# Patient Record
Sex: MALE | Race: WHITE | NOT HISPANIC OR LATINO | Employment: UNEMPLOYED | URBAN - NONMETROPOLITAN AREA
[De-identification: names, ages, dates, MRNs, and addresses within clinical notes are randomized per-mention and may not be internally consistent; named-entity substitution may affect disease eponyms.]

---

## 2018-12-06 ENCOUNTER — APPOINTMENT (OUTPATIENT)
Dept: GENERAL RADIOLOGY | Facility: HOSPITAL | Age: 53
End: 2018-12-06

## 2018-12-06 ENCOUNTER — HOSPITAL ENCOUNTER (EMERGENCY)
Facility: HOSPITAL | Age: 53
Discharge: HOME | End: 2018-12-06
Attending: EMERGENCY MEDICINE | Admitting: EMERGENCY MEDICINE

## 2018-12-06 VITALS
OXYGEN SATURATION: 100 % | TEMPERATURE: 97.6 F | WEIGHT: 175 LBS | SYSTOLIC BLOOD PRESSURE: 137 MMHG | BODY MASS INDEX: 25.92 KG/M2 | DIASTOLIC BLOOD PRESSURE: 92 MMHG | RESPIRATION RATE: 16 BRPM | HEIGHT: 69 IN | HEART RATE: 75 BPM

## 2018-12-06 DIAGNOSIS — S29.9XXA CHEST INJURY, INITIAL ENCOUNTER: Primary | ICD-10-CM

## 2018-12-06 PROCEDURE — 71120 X-RAY EXAM BREASTBONE 2/>VWS: CPT

## 2018-12-06 PROCEDURE — 99284 EMERGENCY DEPT VISIT MOD MDM: CPT

## 2018-12-06 PROCEDURE — 71045 X-RAY EXAM CHEST 1 VIEW: CPT

## 2018-12-06 PROCEDURE — 93005 ELECTROCARDIOGRAM TRACING: CPT | Performed by: EMERGENCY MEDICINE

## 2018-12-06 RX ORDER — IBUPROFEN 800 MG/1
800 TABLET ORAL ONCE
Status: COMPLETED | OUTPATIENT
Start: 2018-12-06 | End: 2018-12-06

## 2018-12-06 RX ORDER — HYDROCODONE BITARTRATE AND ACETAMINOPHEN 5; 325 MG/1; MG/1
1 TABLET ORAL ONCE
Status: COMPLETED | OUTPATIENT
Start: 2018-12-06 | End: 2018-12-06

## 2018-12-06 RX ADMIN — IBUPROFEN 800 MG: 800 TABLET, FILM COATED ORAL at 05:07

## 2018-12-06 RX ADMIN — HYDROCODONE BITARTRATE AND ACETAMINOPHEN 1 TABLET: 5; 325 TABLET ORAL at 05:06

## 2018-12-06 NOTE — ED PROVIDER NOTES
Subjective   53-year-old male presenting with chest injury.  He states that approximately one week ago he slipped and fell while pushing a wheelbarrow, struck the anterior part of his chest.  Since then has had achy type pain across the lower part of his chest, worse with movement and deep breath, has tried nothing to alleviate pain.  He denies any head injury, nausea, vomiting, shortness of breath or other complaints.            Review of Systems   Constitutional: Negative.    HENT: Negative.    Eyes: Negative.    Respiratory: Negative.    Cardiovascular: Positive for chest pain.   Gastrointestinal: Negative.    Genitourinary: Negative.    Musculoskeletal: Negative.    Skin: Negative.    Neurological: Negative.    Psychiatric/Behavioral: Negative.        History reviewed. No pertinent past medical history.    No Known Allergies    History reviewed. No pertinent surgical history.    History reviewed. No pertinent family history.    Social History     Socioeconomic History   • Marital status: Single     Spouse name: Not on file   • Number of children: Not on file   • Years of education: Not on file   • Highest education level: Not on file   Tobacco Use   • Smoking status: Never Smoker   Substance and Sexual Activity   • Alcohol use: No     Frequency: Never   • Drug use: No           Objective   Physical Exam   Constitutional: He is oriented to person, place, and time. He appears well-developed and well-nourished. No distress.   HENT:   Head: Normocephalic and atraumatic.   Right Ear: External ear normal.   Left Ear: External ear normal.   Nose: Nose normal.   Mouth/Throat: Oropharynx is clear and moist.   Eyes: Conjunctivae and EOM are normal. Pupils are equal, round, and reactive to light.   Neck: Normal range of motion. Neck supple.   Cardiovascular: Normal rate, regular rhythm, normal heart sounds and intact distal pulses.   Pulmonary/Chest: Effort normal and breath sounds normal. No respiratory distress.    Diffuse tenderness across the mid to lower chest, no crepitus or palpable deformities   Abdominal: Soft. Bowel sounds are normal. He exhibits no distension. There is no tenderness. There is no rebound and no guarding.   Musculoskeletal: Normal range of motion. He exhibits no edema, tenderness or deformity.   Neurological: He is alert and oriented to person, place, and time.   Skin: Skin is warm and dry. No rash noted.   Psychiatric: He has a normal mood and affect. His behavior is normal.   Nursing note and vitals reviewed.      Procedures           ED Course                  MDM  Number of Diagnoses or Management Options  Chest injury, initial encounter:   Diagnosis management comments: 53-year-old male with chest injury.  Well-developed, well-nourished man in no distress with normal vital signs and exam as above.  We'll check x-ray and EKG.  We'll treat him symptomatically.  Disposition pending workup though anticipate discharge home.    DDX: Contusion, fracture    EKG interpreted by me: sinus rhythm, normal rate, left axis, no acute ST/T changes, this is an abnormal EKG, this appears similar to previous    Xrays per my interpretation reveal no acute abnormalities. Will discharge home.         Amount and/or Complexity of Data Reviewed  Tests in the radiology section of CPT®: reviewed          Final diagnoses:   Chest injury, initial encounter          Carter Bah MD  12/06/18 0556

## 2018-12-31 ENCOUNTER — HOSPITAL ENCOUNTER (OUTPATIENT)
Dept: GENERAL RADIOLOGY | Facility: HOSPITAL | Age: 53
Discharge: HOME | End: 2018-12-31
Admitting: INTERNAL MEDICINE

## 2018-12-31 ENCOUNTER — TRANSCRIBE ORDERS (OUTPATIENT)
Dept: ADMINISTRATIVE | Facility: HOSPITAL | Age: 53
End: 2018-12-31

## 2018-12-31 DIAGNOSIS — R07.89 CHEST WALL PAIN: ICD-10-CM

## 2018-12-31 DIAGNOSIS — R07.89 CHEST WALL PAIN: Primary | ICD-10-CM

## 2018-12-31 PROCEDURE — 71101 X-RAY EXAM UNILAT RIBS/CHEST: CPT

## 2019-03-08 ENCOUNTER — HOSPITAL ENCOUNTER (EMERGENCY)
Facility: HOSPITAL | Age: 54
Discharge: HOME | End: 2019-03-08
Attending: EMERGENCY MEDICINE | Admitting: EMERGENCY MEDICINE

## 2019-03-08 VITALS
RESPIRATION RATE: 16 BRPM | HEART RATE: 111 BPM | OXYGEN SATURATION: 96 % | SYSTOLIC BLOOD PRESSURE: 147 MMHG | TEMPERATURE: 97.6 F | DIASTOLIC BLOOD PRESSURE: 85 MMHG

## 2019-03-08 DIAGNOSIS — T50.901A ACCIDENTAL DRUG OVERDOSE, INITIAL ENCOUNTER: ICD-10-CM

## 2019-03-08 DIAGNOSIS — F19.10 DRUG ABUSE (HCC): Primary | ICD-10-CM

## 2019-03-08 DIAGNOSIS — E87.6 HYPOKALEMIA: ICD-10-CM

## 2019-03-08 LAB
AMPHET+METHAMPHET UR QL: POSITIVE
AMPHETAMINES UR QL: POSITIVE
ANION GAP SERPL CALCULATED.3IONS-SCNC: 13.7 MMOL/L (ref 10–20)
BARBITURATES UR QL SCN: NEGATIVE
BASOPHILS # BLD AUTO: 0.08 10*3/MM3 (ref 0–0.2)
BASOPHILS NFR BLD AUTO: 0.8 % (ref 0–2.5)
BENZODIAZ UR QL SCN: NEGATIVE
BUN BLD-MCNC: 12 MG/DL (ref 7–20)
BUN/CREAT SERPL: 13.3 (ref 6.3–21.9)
BUPRENORPHINE SERPL-MCNC: NEGATIVE NG/ML
CALCIUM SPEC-SCNC: 9.2 MG/DL (ref 8.4–10.2)
CANNABINOIDS SERPL QL: POSITIVE
CHLORIDE SERPL-SCNC: 101 MMOL/L (ref 98–107)
CO2 SERPL-SCNC: 26 MMOL/L (ref 26–30)
COCAINE UR QL: NEGATIVE
CREAT BLD-MCNC: 0.9 MG/DL (ref 0.6–1.3)
DEPRECATED RDW RBC AUTO: 40.5 FL (ref 37–54)
EOSINOPHIL # BLD AUTO: 0.08 10*3/MM3 (ref 0–0.7)
EOSINOPHIL NFR BLD AUTO: 0.8 % (ref 0–7)
ERYTHROCYTE [DISTWIDTH] IN BLOOD BY AUTOMATED COUNT: 13.6 % (ref 11.5–14.5)
GFR SERPL CREATININE-BSD FRML MDRD: 89 ML/MIN/1.73
GLUCOSE BLD-MCNC: 215 MG/DL (ref 74–98)
HCT VFR BLD AUTO: 39.9 % (ref 42–52)
HGB BLD-MCNC: 12.9 G/DL (ref 14–18)
IMM GRANULOCYTES # BLD AUTO: 0.06 10*3/MM3 (ref 0–0.06)
IMM GRANULOCYTES NFR BLD AUTO: 0.6 % (ref 0–0.6)
LYMPHOCYTES # BLD AUTO: 3.64 10*3/MM3 (ref 0.6–3.4)
LYMPHOCYTES NFR BLD AUTO: 35 % (ref 10–50)
MCH RBC QN AUTO: 26.7 PG (ref 27–31)
MCHC RBC AUTO-ENTMCNC: 32.3 G/DL (ref 30–37)
MCV RBC AUTO: 82.4 FL (ref 80–94)
METHADONE UR QL SCN: NEGATIVE
MONOCYTES # BLD AUTO: 0.98 10*3/MM3 (ref 0–0.9)
MONOCYTES NFR BLD AUTO: 9.4 % (ref 0–12)
NEUTROPHILS # BLD AUTO: 5.56 10*3/MM3 (ref 2–6.9)
NEUTROPHILS NFR BLD AUTO: 53.4 % (ref 37–80)
NRBC BLD AUTO-RTO: 0 /100 WBC (ref 0–0)
OPIATES UR QL: POSITIVE
OXYCODONE UR QL SCN: NEGATIVE
PCP UR QL SCN: NEGATIVE
PLATELET # BLD AUTO: 335 10*3/MM3 (ref 130–400)
PMV BLD AUTO: 9.4 FL (ref 6–12)
POTASSIUM BLD-SCNC: 2.7 MMOL/L (ref 3.5–5.1)
PROPOXYPH UR QL: NEGATIVE
RBC # BLD AUTO: 4.84 10*6/MM3 (ref 4.7–6.1)
SODIUM BLD-SCNC: 138 MMOL/L (ref 137–145)
TRICYCLICS UR QL SCN: NEGATIVE
WBC NRBC COR # BLD: 10.4 10*3/MM3 (ref 4.8–10.8)

## 2019-03-08 PROCEDURE — 99284 EMERGENCY DEPT VISIT MOD MDM: CPT

## 2019-03-08 PROCEDURE — 85025 COMPLETE CBC W/AUTO DIFF WBC: CPT | Performed by: EMERGENCY MEDICINE

## 2019-03-08 PROCEDURE — 93005 ELECTROCARDIOGRAM TRACING: CPT | Performed by: EMERGENCY MEDICINE

## 2019-03-08 PROCEDURE — 80306 DRUG TEST PRSMV INSTRMNT: CPT | Performed by: EMERGENCY MEDICINE

## 2019-03-08 PROCEDURE — 80048 BASIC METABOLIC PNL TOTAL CA: CPT | Performed by: EMERGENCY MEDICINE

## 2019-03-08 RX ORDER — POTASSIUM CHLORIDE 20 MEQ/1
20 TABLET, EXTENDED RELEASE ORAL DAILY
Qty: 4 TABLET | Refills: 0 | Status: SHIPPED | OUTPATIENT
Start: 2019-03-08 | End: 2019-03-12

## 2019-03-08 RX ORDER — ONDANSETRON 4 MG/1
4 TABLET, ORALLY DISINTEGRATING ORAL ONCE
Status: COMPLETED | OUTPATIENT
Start: 2019-03-08 | End: 2019-03-08

## 2019-03-08 RX ORDER — POTASSIUM CHLORIDE 750 MG/1
40 CAPSULE, EXTENDED RELEASE ORAL ONCE
Status: COMPLETED | OUTPATIENT
Start: 2019-03-08 | End: 2019-03-08

## 2019-03-08 RX ADMIN — ONDANSETRON 4 MG: 4 TABLET, ORALLY DISINTEGRATING ORAL at 10:51

## 2019-03-08 RX ADMIN — POTASSIUM CHLORIDE 40 MEQ: 750 CAPSULE, EXTENDED RELEASE ORAL at 10:11

## 2019-03-08 NOTE — ED NOTES
Patient unable to provide a urine sample.  In & out cath performed per sterile technique with Homer Lopez RN.  Unable to use straight cath due to prostate.  Coude 18 Amharic used.  UA collected.       Angella Roy, RN  03/08/19 7948

## 2019-03-08 NOTE — ED NOTES
Patient given Zofran 4 mg ODT and discharge instructions.  Officer Milly given discharge instructions with new rx.      Angella Roy RN  03/08/19 9070

## 2019-03-08 NOTE — ED PROVIDER NOTES
Subjective   History of Present Illness   53-year-old male brought in by EMS after found unresponsive, given 2 mg of intranasal Narcan, with return to baseline mental status.  Patient denies any using any illicit drugs.  States that he use some tramadol, steroids, Flexeril and went to work.  Refuses to answer any other specific questions and denies multiple times using any illicit drugs.    Review of Systems   All other systems reviewed and are negative.      History reviewed. No pertinent past medical history.    No Known Allergies    History reviewed. No pertinent surgical history.    History reviewed. No pertinent family history.    Social History     Socioeconomic History   • Marital status: Single     Spouse name: Not on file   • Number of children: Not on file   • Years of education: Not on file   • Highest education level: Not on file   Tobacco Use   • Smoking status: Never Smoker   Substance and Sexual Activity   • Alcohol use: No     Frequency: Never   • Drug use: No           Objective   Physical Exam   Constitutional: He is oriented to person, place, and time. He appears well-developed and well-nourished. No distress.   HENT:   Head: Normocephalic.   Mouth/Throat: Oropharynx is clear and moist.   Eyes: EOM are normal. No scleral icterus.   Pupils are pinpoint bilaterally.   Neck: Normal range of motion. Neck supple. No tracheal deviation present.   Cardiovascular: Normal rate, regular rhythm, normal heart sounds and intact distal pulses. Exam reveals no gallop and no friction rub.   No murmur heard.  Pulmonary/Chest: Effort normal and breath sounds normal. No stridor. No respiratory distress. He has no wheezes. He has no rales. He exhibits no tenderness.   Abdominal: Soft. He exhibits no distension and no mass. There is no tenderness. There is no rebound and no guarding.   Musculoskeletal: Normal range of motion. He exhibits no deformity.   Neurological: He is alert and oriented to person, place, and  time.   Skin: Skin is warm and dry. No rash noted. He is not diaphoretic. No erythema. No pallor.   Psychiatric: He has a normal mood and affect. His behavior is normal.   Nursing note and vitals reviewed.      Procedures           ED Course  ED Course as of Mar 08 1015   Fri Mar 08, 2019   0940 EKG: Interpreted by me.  Sinus tachycardia with a rate of 115, narrow complex, normal intervals, no ST elevation or depression.  Abnormal EKG.  [AI]      ED Course User Index  [AI] Carlos Freitas MD                  [facility]  53-year-old male here with EMS after found unresponsive with return after Narcan.  Afebrile, vital signs are normal for mild tachycardia.  High suspicion that he overdosed on opiate of some sort.  Low suspicion that he is being truthful about his drug use.  Given that he will not admit to drug use and I cannot otherwise explain his unresponsiveness, we will send lab work including a drug screen, EKG, and monitor further.    10:15 AM patient with low potassium that we are repleting and will send home with more.  Labs otherwise reassuring other than opiates, amphetamines, and methamphetamines in his urine.  Patient is remained stable throughout his stay as will discharge at this time.    Final diagnoses:   Drug abuse (CMS/[facility])   Accidental drug overdose, initial encounter   Hypokalemia            Carlos Freitas MD  03/08/19 1016

## 2019-03-08 NOTE — ED NOTES
Patient was discharged and in custody of [facility] Officer Olrando Atkins.  He stated that he needed to go the bathroom to urinate.  Patient began vomiting.  MD notified.      Angella Roy, RN  03/08/19 1282

## 2020-11-11 ENCOUNTER — HOSPITAL ENCOUNTER (EMERGENCY)
Facility: HOSPITAL | Age: 55
Discharge: HOME | End: 2020-11-11
Attending: EMERGENCY MEDICINE | Admitting: EMERGENCY MEDICINE

## 2020-11-11 ENCOUNTER — APPOINTMENT (OUTPATIENT)
Dept: ULTRASOUND IMAGING | Facility: HOSPITAL | Age: 55
End: 2020-11-11

## 2020-11-11 VITALS
WEIGHT: 180 LBS | DIASTOLIC BLOOD PRESSURE: 74 MMHG | HEIGHT: 69 IN | OXYGEN SATURATION: 100 % | TEMPERATURE: 99.1 F | HEART RATE: 80 BPM | BODY MASS INDEX: 26.66 KG/M2 | SYSTOLIC BLOOD PRESSURE: 129 MMHG | RESPIRATION RATE: 18 BRPM

## 2020-11-11 DIAGNOSIS — I77.6 VASCULITIS (HCC): Primary | ICD-10-CM

## 2020-11-11 LAB
ALBUMIN SERPL-MCNC: 3 G/DL (ref 3.5–5.2)
ALBUMIN/GLOB SERPL: 0.8 G/DL
ALP SERPL-CCNC: 124 U/L (ref 39–117)
ALT SERPL W P-5'-P-CCNC: 165 U/L (ref 1–41)
ANION GAP SERPL CALCULATED.3IONS-SCNC: 8.9 MMOL/L (ref 5–15)
AST SERPL-CCNC: 131 U/L (ref 1–40)
BILIRUB SERPL-MCNC: 0.2 MG/DL (ref 0–1.2)
BUN SERPL-MCNC: 13 MG/DL (ref 6–20)
BUN/CREAT SERPL: 20.6 (ref 7–25)
CALCIUM SPEC-SCNC: 8.6 MG/DL (ref 8.6–10.5)
CHLORIDE SERPL-SCNC: 99 MMOL/L (ref 98–107)
CO2 SERPL-SCNC: 27.1 MMOL/L (ref 22–29)
CREAT SERPL-MCNC: 0.63 MG/DL (ref 0.76–1.27)
CRP SERPL-MCNC: 7.87 MG/DL (ref 0–0.5)
DEPRECATED RDW RBC AUTO: 39.8 FL (ref 37–54)
EOSINOPHIL # BLD MANUAL: 0.25 10*3/MM3 (ref 0–0.4)
EOSINOPHIL NFR BLD MANUAL: 4 % (ref 0.3–6.2)
ERYTHROCYTE [DISTWIDTH] IN BLOOD BY AUTOMATED COUNT: 14 % (ref 12.3–15.4)
ERYTHROCYTE [SEDIMENTATION RATE] IN BLOOD: 38 MM/HR (ref 0–15)
GFR SERPL CREATININE-BSD FRML MDRD: 133 ML/MIN/1.73
GLOBULIN UR ELPH-MCNC: 3.7 GM/DL
GLUCOSE SERPL-MCNC: 123 MG/DL (ref 65–99)
HCT VFR BLD AUTO: 33.4 % (ref 37.5–51)
HGB BLD-MCNC: 10.5 G/DL (ref 13–17.7)
LYMPHOCYTES # BLD MANUAL: 1.75 10*3/MM3 (ref 0.7–3.1)
LYMPHOCYTES NFR BLD MANUAL: 28 % (ref 19.6–45.3)
LYMPHOCYTES NFR BLD MANUAL: 8 % (ref 5–12)
MCH RBC QN AUTO: 24.7 PG (ref 26.6–33)
MCHC RBC AUTO-ENTMCNC: 31.4 G/DL (ref 31.5–35.7)
MCV RBC AUTO: 78.6 FL (ref 79–97)
MONOCYTES # BLD AUTO: 0.5 10*3/MM3 (ref 0.1–0.9)
NEUTROPHILS # BLD AUTO: 3.44 10*3/MM3 (ref 1.7–7)
NEUTROPHILS NFR BLD MANUAL: 53 % (ref 42.7–76)
NEUTS BAND NFR BLD MANUAL: 2 % (ref 0–5)
PLAT MORPH BLD: NORMAL
PLATELET # BLD AUTO: 246 10*3/MM3 (ref 140–450)
PMV BLD AUTO: 8.6 FL (ref 6–12)
POTASSIUM SERPL-SCNC: 3.9 MMOL/L (ref 3.5–5.2)
PROT SERPL-MCNC: 6.7 G/DL (ref 6–8.5)
RBC # BLD AUTO: 4.25 10*6/MM3 (ref 4.14–5.8)
RBC MORPH BLD: NORMAL
SCAN SLIDE: NORMAL
SMALL PLATELETS BLD QL SMEAR: ADEQUATE
SODIUM SERPL-SCNC: 135 MMOL/L (ref 136–145)
URATE SERPL-MCNC: 4.7 MG/DL (ref 3.4–7)
VARIANT LYMPHS NFR BLD MANUAL: 5 % (ref 0–5)
WBC # BLD AUTO: 6.25 10*3/MM3 (ref 3.4–10.8)
WBC MORPH BLD: NORMAL

## 2020-11-11 PROCEDURE — 85007 BL SMEAR W/DIFF WBC COUNT: CPT | Performed by: PHYSICIAN ASSISTANT

## 2020-11-11 PROCEDURE — 99283 EMERGENCY DEPT VISIT LOW MDM: CPT

## 2020-11-11 PROCEDURE — 93970 EXTREMITY STUDY: CPT

## 2020-11-11 PROCEDURE — 84550 ASSAY OF BLOOD/URIC ACID: CPT | Performed by: PHYSICIAN ASSISTANT

## 2020-11-11 PROCEDURE — 85651 RBC SED RATE NONAUTOMATED: CPT | Performed by: PHYSICIAN ASSISTANT

## 2020-11-11 PROCEDURE — 80053 COMPREHEN METABOLIC PANEL: CPT | Performed by: PHYSICIAN ASSISTANT

## 2020-11-11 PROCEDURE — 85025 COMPLETE CBC W/AUTO DIFF WBC: CPT | Performed by: PHYSICIAN ASSISTANT

## 2020-11-11 PROCEDURE — 86140 C-REACTIVE PROTEIN: CPT | Performed by: PHYSICIAN ASSISTANT

## 2020-11-11 RX ORDER — CEPHALEXIN 500 MG/1
500 CAPSULE ORAL 2 TIMES DAILY
Qty: 20 CAPSULE | Refills: 0 | Status: SHIPPED | OUTPATIENT
Start: 2020-11-11

## 2020-11-11 RX ORDER — PREDNISONE 20 MG/1
20 TABLET ORAL DAILY
Qty: 2 TABLET | Refills: 0 | Status: SHIPPED | OUTPATIENT
Start: 2020-11-11

## 2020-11-11 RX ORDER — SODIUM CHLORIDE 0.9 % (FLUSH) 0.9 %
10 SYRINGE (ML) INJECTION AS NEEDED
Status: DISCONTINUED | OUTPATIENT
Start: 2020-11-11 | End: 2020-11-11 | Stop reason: HOSPADM

## 2020-11-11 RX ORDER — INDOMETHACIN 50 MG/1
50 CAPSULE ORAL
Qty: 15 CAPSULE | Refills: 0 | Status: SHIPPED | OUTPATIENT
Start: 2020-11-11

## 2020-11-11 RX ADMIN — SODIUM CHLORIDE 500 ML: 9 INJECTION, SOLUTION INTRAVENOUS at 14:47

## 2020-11-11 NOTE — ED PROVIDER NOTES
Subjective   This is a 55-year-old male who presents to the emergency department chief complaint bilateral lower extremity edema and swelling over the past day.  Patient denies any recent injury or trauma.  Patient denies any associated chest pain, shortness of breath, weakness.  Patient states that he is on his feet for long hours during the day.  Patient states that he started developing lower extremity edema as well as rash.      History provided by:  Patient   used: No    Leg Swelling  Location:  Bilateral leg swelling  Quality:  Aching, throbbing pain  Severity:  Moderate  Onset quality:  Gradual  Duration:  1 day  Timing:  Intermittent  Progression:  Worsening  Chronicity:  New  Associated symptoms: no chest pain, no congestion, no cough, no diarrhea, no fever, no headaches, no myalgias, no rash, no rhinorrhea, no shortness of breath, no sore throat and no vomiting        Review of Systems   Constitutional: Negative.  Negative for fever.   HENT: Negative for congestion, rhinorrhea and sore throat.    Eyes: Negative.  Negative for pain.   Respiratory: Negative for apnea, cough, choking, chest tightness and shortness of breath.    Cardiovascular: Negative.  Negative for chest pain.   Gastrointestinal: Negative.  Negative for diarrhea and vomiting.   Genitourinary: Negative for discharge, dysuria, enuresis, flank pain, frequency, genital sores and hematuria.   Musculoskeletal: Positive for joint swelling. Negative for gait problem and myalgias.   Skin: Negative for rash.   Neurological: Negative for headaches.   All other systems reviewed and are negative.      Past Medical History:   Diagnosis Date   • Pancreatitis        No Known Allergies    Past Surgical History:   Procedure Laterality Date   • APPENDECTOMY         History reviewed. No pertinent family history.    Social History     Socioeconomic History   • Marital status: Single     Spouse name: Not on file   • Number of children: Not on  file   • Years of education: Not on file   • Highest education level: Not on file   Tobacco Use   • Smoking status: Never Smoker   Substance and Sexual Activity   • Alcohol use: No     Frequency: Never   • Drug use: No           Objective   Physical Exam  Vitals signs and nursing note reviewed.   Constitutional:       General: He is not in acute distress.     Appearance: Normal appearance. He is normal weight. He is not ill-appearing, toxic-appearing or diaphoretic.   HENT:      Head: Normocephalic and atraumatic.      Right Ear: Tympanic membrane, ear canal and external ear normal.      Left Ear: Tympanic membrane, ear canal and external ear normal.      Nose: Nose normal. No congestion or rhinorrhea.      Mouth/Throat:      Mouth: Mucous membranes are dry.      Pharynx: Oropharynx is clear. No oropharyngeal exudate or posterior oropharyngeal erythema.   Eyes:      General: No scleral icterus.        Right eye: No discharge.         Left eye: No discharge.      Extraocular Movements: Extraocular movements intact.      Conjunctiva/sclera: Conjunctivae normal.      Pupils: Pupils are equal, round, and reactive to light.   Neck:      Musculoskeletal: Normal range of motion and neck supple. No neck rigidity or muscular tenderness.      Vascular: No carotid bruit.   Cardiovascular:      Rate and Rhythm: Normal rate and regular rhythm.      Pulses: Normal pulses.      Heart sounds: Normal heart sounds. No murmur. No friction rub. No gallop.    Pulmonary:      Effort: Pulmonary effort is normal. No respiratory distress.      Breath sounds: Normal breath sounds. No stridor. No wheezing or rhonchi.   Chest:      Chest wall: No tenderness.   Abdominal:      General: Abdomen is flat. Bowel sounds are normal. There is no distension.      Palpations: Abdomen is soft. There is no mass.      Tenderness: There is no abdominal tenderness. There is no right CVA tenderness.      Hernia: No hernia is present.   Musculoskeletal:          General: Swelling and tenderness present.      Right ankle: He exhibits decreased range of motion and swelling. Tenderness.      Left ankle: He exhibits decreased range of motion and swelling. Tenderness.   Lymphadenopathy:      Cervical: No cervical adenopathy.   Skin:     General: Skin is warm.      Capillary Refill: Capillary refill takes less than 2 seconds.      Coloration: Skin is not jaundiced or pale.      Findings: No bruising, erythema or lesion.   Neurological:      General: No focal deficit present.      Mental Status: He is alert and oriented to person, place, and time.      Cranial Nerves: No cranial nerve deficit.      Sensory: No sensory deficit.      Motor: No weakness.      Coordination: Coordination normal.      Gait: Gait normal.   Psychiatric:         Mood and Affect: Mood normal.         Behavior: Behavior normal.         Thought Content: Thought content normal.         Judgment: Judgment normal.         Procedures           ED Course  ED Course as of Nov 11 1618   Wed Nov 11, 2020   1605 IMPRESSION:  No sonographic evidence of deep venous thrombosis of the  lower extremities.       [BH]   1616 This is a 55-year-old male who presents with chief complaint of lower extremity swelling and edema.  Patient did have negative work-up for any acute DVT or blood clot.  Patient's uric acid level is within normal limits.  Patient does have some findings consistent with vasculitis.  Patient will be started on oral antibiotics as well as anti-inflammatories.  Patient advised to return to the emergency department if condition worsens.    []      ED Course User Index  [] Raffy Arreaga PA-C                                           [facility]    Final diagnoses:   Vasculitis (CMS/[facility])            Raffy Arreaga PA-C  11/11/20 1614

## 2023-06-06 ENCOUNTER — OFFICE VISIT (OUTPATIENT)
Dept: NEUROSURGERY | Facility: CLINIC | Age: 58
End: 2023-06-06
Payer: MEDICAID

## 2023-06-06 VITALS
SYSTOLIC BLOOD PRESSURE: 140 MMHG | HEIGHT: 69 IN | TEMPERATURE: 97.8 F | DIASTOLIC BLOOD PRESSURE: 82 MMHG | BODY MASS INDEX: 26.66 KG/M2 | WEIGHT: 180 LBS

## 2023-06-06 DIAGNOSIS — M47.817 LUMBOSACRAL SPONDYLOSIS WITHOUT MYELOPATHY: Primary | ICD-10-CM

## 2023-06-06 PROCEDURE — 99204 OFFICE O/P NEW MOD 45 MIN: CPT | Performed by: STUDENT IN AN ORGANIZED HEALTH CARE EDUCATION/TRAINING PROGRAM

## 2023-06-06 PROCEDURE — 1160F RVW MEDS BY RX/DR IN RCRD: CPT | Performed by: STUDENT IN AN ORGANIZED HEALTH CARE EDUCATION/TRAINING PROGRAM

## 2023-06-06 PROCEDURE — 1159F MED LIST DOCD IN RCRD: CPT | Performed by: STUDENT IN AN ORGANIZED HEALTH CARE EDUCATION/TRAINING PROGRAM

## 2023-06-06 RX ORDER — FAMOTIDINE 20 MG/1
TABLET, FILM COATED ORAL
COMMUNITY
Start: 2023-04-26

## 2023-06-06 RX ORDER — OMEPRAZOLE 40 MG/1
CAPSULE, DELAYED RELEASE ORAL
COMMUNITY
Start: 2023-04-26

## 2023-06-06 RX ORDER — AMLODIPINE BESYLATE 10 MG/1
1 TABLET ORAL DAILY
COMMUNITY
Start: 2023-04-26
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
## 2024-09-10 DIAGNOSIS — M54.16 LUMBAR RADICULOPATHY: Primary | ICD-10-CM

## 2024-09-12 ENCOUNTER — OFFICE VISIT (OUTPATIENT)
Dept: NEUROSURGERY | Facility: CLINIC | Age: 59
End: 2024-09-12
Payer: MEDICAID

## 2024-09-12 VITALS — WEIGHT: 183 LBS | HEIGHT: 69 IN | TEMPERATURE: 97.8 F | BODY MASS INDEX: 27.11 KG/M2

## 2024-09-12 DIAGNOSIS — M51.36 DDD (DEGENERATIVE DISC DISEASE), LUMBAR: ICD-10-CM

## 2024-09-12 DIAGNOSIS — M54.16 LUMBAR RADICULOPATHY: Primary | ICD-10-CM

## 2024-09-12 RX ORDER — BACLOFEN 10 MG/1
TABLET ORAL
COMMUNITY

## 2024-09-12 RX ORDER — HYDROCHLOROTHIAZIDE 12.5 MG/1
12.5 CAPSULE ORAL DAILY
COMMUNITY

## 2024-09-12 RX ORDER — METOPROLOL SUCCINATE 25 MG/1
25 TABLET, EXTENDED RELEASE ORAL DAILY
COMMUNITY
Start: 2024-06-14

## 2024-09-12 RX ORDER — KETOROLAC TROMETHAMINE 10 MG/1
10 TABLET, FILM COATED ORAL EVERY 6 HOURS PRN
COMMUNITY
Start: 2024-07-23

## 2024-09-12 RX ORDER — ONDANSETRON 4 MG/1
TABLET, ORALLY DISINTEGRATING ORAL
COMMUNITY
Start: 2024-09-05

## 2024-09-12 RX ORDER — CYCLOBENZAPRINE HCL 10 MG
10 TABLET ORAL 3 TIMES DAILY PRN
COMMUNITY
Start: 2024-07-23

## 2024-09-12 RX ORDER — GABAPENTIN 600 MG/1
TABLET ORAL
COMMUNITY
Start: 2024-09-10

## 2024-09-12 RX ORDER — METRONIDAZOLE 500 MG/1
1 TABLET ORAL 3 TIMES DAILY
COMMUNITY
Start: 2024-09-05

## 2024-09-12 RX ORDER — LISINOPRIL 5 MG/1
5 TABLET ORAL DAILY
COMMUNITY

## 2024-09-12 RX ORDER — PREDNISONE 20 MG/1
2 TABLET ORAL DAILY
COMMUNITY
Start: 2024-07-23

## 2024-09-12 RX ORDER — ATORVASTATIN CALCIUM 40 MG/1
40 TABLET, FILM COATED ORAL DAILY
COMMUNITY

## 2024-09-12 NOTE — PROGRESS NOTES
"NEUROSURGERY PROGRESS NOTE    Patient: Aldo Humphrey  : 1965    Primary Care Provider: Andie Murray APRN    Chief Complaint: Right leg pain    Subjective: This is a 59-year-old male who is previous evaluated for lower back pain.  He comes in with progressively worsening right leg pain.  He states has been dealing with this for quite some time.  He has pain starts in his right buttock and radiates down the lateral aspect of his leg to the foot.  He has previously done physical therapy and injections, but this has not given him any significant relief.  He denies any significant left leg symptoms or bowel or bladder incontinence.    Objective    Vital Signs: Temperature 97.8 °F (36.6 °C), temperature source Infrared, height 175.3 cm (69\"), weight 83 kg (183 lb).    Physical Exam  Awake, alert and oriented x 3  Opens eyes spont  Pupils 3 mm rx bilat  Extraocular muscles intact bilaterally  Face symmetric bilaterally  Tongue midline  5/5 in the lower extremities bilaterally with exception of right dorsiflexion which is 3 out of 5    Current Medications:   Current Outpatient Medications:     amLODIPine (NORVASC) 10 MG tablet, Take 1 tablet by mouth Daily., Disp: , Rfl:     atorvastatin (LIPITOR) 40 MG tablet, Take 1 tablet by mouth Daily., Disp: , Rfl:     baclofen (LIORESAL) 10 MG tablet, TAKE 1 TO 2 TABLETS BY MOUTH TWICE DAILY AS NEEDED FOR PAIN, Disp: , Rfl:     cyclobenzaprine (FLEXERIL) 10 MG tablet, Take 1 tablet by mouth 3 (Three) Times a Day As Needed. for muscle spams, Disp: , Rfl:     famotidine (PEPCID) 20 MG tablet, TAKE 1 TABLET BY MOUTH EVERY DAY AT BEDTIME - MAY TAKE UP TO TWICE DAILY IF NEEDED/AS DIRECTED, Disp: , Rfl:     gabapentin (NEURONTIN) 600 MG tablet, TAKE 1 TABLET BY MOUTH THREE TIMES DAILY AS NEEDED FOR BACK/ LEG PAIN, Disp: , Rfl:     hydroCHLOROthiazide (MICROZIDE) 12.5 MG capsule, Take 1 capsule by mouth Daily., Disp: , Rfl:     ketorolac (TORADOL) 10 MG tablet, Take 1 " tablet by mouth Every 6 (Six) Hours As Needed. for pain, Disp: , Rfl:     lisinopril (PRINIVIL,ZESTRIL) 5 MG tablet, Take 1 tablet by mouth Daily., Disp: , Rfl:     metoprolol succinate XL (TOPROL-XL) 25 MG 24 hr tablet, Take 1 tablet by mouth Daily., Disp: , Rfl:     metroNIDAZOLE (FLAGYL) 500 MG tablet, Take 1 tablet by mouth 3 times a day., Disp: , Rfl:     omeprazole (priLOSEC) 40 MG capsule, TAKE 1 CAPSULE BY MOUTH ONCE DAILY 30 MINUTES BEFORE FIRST MEAL, Disp: , Rfl:     ondansetron ODT (ZOFRAN-ODT) 4 MG disintegrating tablet, dissolve 1 tablet in mouth every 8 hours as needed for nausea, Disp: , Rfl:     predniSONE (DELTASONE) 20 MG tablet, Take 2 tablets by mouth Daily., Disp: , Rfl:      Laboratory Results:                              Brief Urine Lab Results       None          Microbiology Results (last 10 days)       ** No results found for the last 240 hours. **            Diagnostic Imaging: I reviewed and independently interpreted the new imaging.     Assessment/Plan:  This is a 59-year-old male who presents with worsening back and right leg pain.  In reviewing the patient's lumbar MRI, unfortunately it is of very poor quality.  It is quite blurry at the L4-5 level and it is difficult to discern how much neuroforaminal stenosis is present.  I think it is possible the patient is having symptoms of an L5 radiculopathy given his distribution of pain and dorsiflexion weakness, but I would like to have a better visualization with a new MRI scan.  Additionally, we are going to obtain lumbar flexion-extension x-rays.  We will have the patient follow-up with me after these test results to discuss what they show and if there will be any role for surgical intervention.    Diagnoses and all orders for this visit:    1. Lumbar radiculopathy (Primary)  -     MRI Lumbar Spine Without Contrast; Future  -     XR Spine Lumbar Flex & Ext; Future    2. DDD (degenerative disc disease), lumbar  -     MRI Lumbar Spine  Without Contrast; Future  -     XR Spine Lumbar Flex & Ext; Future      Tennis Ray Kam  reports that he has never smoked. He has been exposed to tobacco smoke. He has never used smokeless tobacco.         Luis Lopez MD  09/12/24  14:22 EDT

## 2024-09-27 ENCOUNTER — OFFICE VISIT (OUTPATIENT)
Dept: NEUROSURGERY | Facility: CLINIC | Age: 59
End: 2024-09-27
Payer: MEDICAID

## 2024-09-27 VITALS — BODY MASS INDEX: 27.4 KG/M2 | WEIGHT: 185 LBS | TEMPERATURE: 96.6 F | HEIGHT: 69 IN

## 2024-09-27 DIAGNOSIS — M54.16 LUMBAR RADICULOPATHY: ICD-10-CM

## 2024-09-27 DIAGNOSIS — M51.369 DDD (DEGENERATIVE DISC DISEASE), LUMBAR: Primary | ICD-10-CM

## 2025-04-02 ENCOUNTER — OFFICE VISIT (OUTPATIENT)
Dept: NEUROSURGERY | Facility: CLINIC | Age: 60
End: 2025-04-02
Payer: MEDICAID

## 2025-04-02 ENCOUNTER — TELEPHONE (OUTPATIENT)
Dept: NEUROSURGERY | Facility: CLINIC | Age: 60
End: 2025-04-02

## 2025-04-02 VITALS
TEMPERATURE: 97.8 F | DIASTOLIC BLOOD PRESSURE: 78 MMHG | WEIGHT: 186.2 LBS | SYSTOLIC BLOOD PRESSURE: 128 MMHG | HEIGHT: 69 IN | BODY MASS INDEX: 27.58 KG/M2

## 2025-04-02 DIAGNOSIS — M47.817 LUMBOSACRAL SPONDYLOSIS WITHOUT MYELOPATHY: ICD-10-CM

## 2025-04-02 DIAGNOSIS — M51.362 DEGENERATION OF INTERVERTEBRAL DISC OF LUMBAR REGION WITH DISCOGENIC BACK PAIN AND LOWER EXTREMITY PAIN: Primary | ICD-10-CM

## 2025-04-02 DIAGNOSIS — M54.16 LUMBAR RADICULOPATHY: Primary | ICD-10-CM

## 2025-04-02 PROCEDURE — 1159F MED LIST DOCD IN RCRD: CPT | Performed by: PHYSICIAN ASSISTANT

## 2025-04-02 PROCEDURE — 99214 OFFICE O/P EST MOD 30 MIN: CPT | Performed by: PHYSICIAN ASSISTANT

## 2025-04-02 PROCEDURE — 1160F RVW MEDS BY RX/DR IN RCRD: CPT | Performed by: PHYSICIAN ASSISTANT
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
## 2025-04-15 ENCOUNTER — HOSPITAL ENCOUNTER (INPATIENT)
Facility: HOSPITAL | Age: 60
LOS: 1 days | Discharge: LEFT BEFORE DISCHARGE | DRG: 881 | End: 2025-04-16
Attending: SPECIALIST | Admitting: SPECIALIST
Payer: MEDICAID

## 2025-04-15 ENCOUNTER — HOSPITAL ENCOUNTER (EMERGENCY)
Facility: HOSPITAL | Age: 60
Discharge: PSYCHIATRIC OR SUBSTANCE USE FACILITY | DRG: 881 | End: 2025-04-15
Attending: EMERGENCY MEDICINE | Admitting: EMERGENCY MEDICINE
Payer: MEDICAID

## 2025-04-15 VITALS
TEMPERATURE: 98 F | SYSTOLIC BLOOD PRESSURE: 161 MMHG | OXYGEN SATURATION: 100 % | HEART RATE: 56 BPM | HEIGHT: 69 IN | DIASTOLIC BLOOD PRESSURE: 94 MMHG | RESPIRATION RATE: 18 BRPM | WEIGHT: 186 LBS | BODY MASS INDEX: 27.55 KG/M2

## 2025-04-15 DIAGNOSIS — R45.851 SUICIDAL IDEATION: Primary | ICD-10-CM

## 2025-04-15 LAB
ALBUMIN SERPL-MCNC: 4.3 G/DL (ref 3.5–5.2)
ALBUMIN/GLOB SERPL: 1 G/DL
ALP SERPL-CCNC: 151 U/L (ref 39–117)
ALT SERPL W P-5'-P-CCNC: 8 U/L (ref 1–41)
AMPHET+METHAMPHET UR QL: POSITIVE
AMPHETAMINES UR QL: POSITIVE
ANION GAP SERPL CALCULATED.3IONS-SCNC: 12.2 MMOL/L (ref 5–15)
APAP SERPL-MCNC: <5 MCG/ML (ref 0–30)
AST SERPL-CCNC: 16 U/L (ref 1–40)
BARBITURATES UR QL SCN: NEGATIVE
BASOPHILS # BLD AUTO: 0.09 10*3/MM3 (ref 0–0.2)
BASOPHILS NFR BLD AUTO: 0.9 % (ref 0–1.5)
BENZODIAZ UR QL SCN: NEGATIVE
BILIRUB SERPL-MCNC: 0.3 MG/DL (ref 0–1.2)
BUN SERPL-MCNC: 10 MG/DL (ref 6–20)
BUN/CREAT SERPL: 12.7 (ref 7–25)
BUPRENORPHINE SERPL-MCNC: NEGATIVE NG/ML
CALCIUM SPEC-SCNC: 9.6 MG/DL (ref 8.6–10.5)
CANNABINOIDS SERPL QL: POSITIVE
CHLORIDE SERPL-SCNC: 99 MMOL/L (ref 98–107)
CO2 SERPL-SCNC: 25.8 MMOL/L (ref 22–29)
COCAINE UR QL: NEGATIVE
CREAT SERPL-MCNC: 0.79 MG/DL (ref 0.76–1.27)
DEPRECATED RDW RBC AUTO: 45.6 FL (ref 37–54)
EGFRCR SERPLBLD CKD-EPI 2021: 103 ML/MIN/1.73
EOSINOPHIL # BLD AUTO: 0.14 10*3/MM3 (ref 0–0.4)
EOSINOPHIL NFR BLD AUTO: 1.4 % (ref 0.3–6.2)
ERYTHROCYTE [DISTWIDTH] IN BLOOD BY AUTOMATED COUNT: 15.4 % (ref 12.3–15.4)
ETHANOL BLD-MCNC: <10 MG/DL (ref 0–10)
ETHANOL UR QL: <0.01 %
FENTANYL UR-MCNC: NEGATIVE NG/ML
GLOBULIN UR ELPH-MCNC: 4.2 GM/DL
GLUCOSE SERPL-MCNC: 99 MG/DL (ref 65–99)
HCT VFR BLD AUTO: 45.8 % (ref 37.5–51)
HGB BLD-MCNC: 14.8 G/DL (ref 13–17.7)
HOLD SPECIMEN: NORMAL
HOLD SPECIMEN: NORMAL
IMM GRANULOCYTES # BLD AUTO: 0.06 10*3/MM3 (ref 0–0.05)
IMM GRANULOCYTES NFR BLD AUTO: 0.6 % (ref 0–0.5)
LYMPHOCYTES # BLD AUTO: 3.54 10*3/MM3 (ref 0.7–3.1)
LYMPHOCYTES NFR BLD AUTO: 35.7 % (ref 19.6–45.3)
MAGNESIUM SERPL-MCNC: 2.2 MG/DL (ref 1.6–2.6)
MCH RBC QN AUTO: 26.3 PG (ref 26.6–33)
MCHC RBC AUTO-ENTMCNC: 32.3 G/DL (ref 31.5–35.7)
MCV RBC AUTO: 81.5 FL (ref 79–97)
METHADONE UR QL SCN: NEGATIVE
MONOCYTES # BLD AUTO: 0.59 10*3/MM3 (ref 0.1–0.9)
MONOCYTES NFR BLD AUTO: 5.9 % (ref 5–12)
NEUTROPHILS NFR BLD AUTO: 5.5 10*3/MM3 (ref 1.7–7)
NEUTROPHILS NFR BLD AUTO: 55.5 % (ref 42.7–76)
NRBC BLD AUTO-RTO: 0 /100 WBC (ref 0–0.2)
OPIATES UR QL: NEGATIVE
OXYCODONE UR QL SCN: NEGATIVE
PCP UR QL SCN: NEGATIVE
PLATELET # BLD AUTO: 295 10*3/MM3 (ref 140–450)
PMV BLD AUTO: 8.8 FL (ref 6–12)
POTASSIUM SERPL-SCNC: 4.7 MMOL/L (ref 3.5–5.2)
PROT SERPL-MCNC: 8.5 G/DL (ref 6–8.5)
RBC # BLD AUTO: 5.62 10*6/MM3 (ref 4.14–5.8)
SALICYLATES SERPL-MCNC: <0.3 MG/DL
SODIUM SERPL-SCNC: 137 MMOL/L (ref 136–145)
TRICYCLICS UR QL SCN: NEGATIVE
TSH SERPL DL<=0.05 MIU/L-ACNC: 3.81 UIU/ML (ref 0.27–4.2)
WBC NRBC COR # BLD AUTO: 9.92 10*3/MM3 (ref 3.4–10.8)
WHOLE BLOOD HOLD COAG: NORMAL
WHOLE BLOOD HOLD SPECIMEN: NORMAL

## 2025-04-15 PROCEDURE — 84443 ASSAY THYROID STIM HORMONE: CPT | Performed by: EMERGENCY MEDICINE

## 2025-04-15 PROCEDURE — 80143 DRUG ASSAY ACETAMINOPHEN: CPT | Performed by: EMERGENCY MEDICINE

## 2025-04-15 PROCEDURE — 82077 ASSAY SPEC XCP UR&BREATH IA: CPT | Performed by: EMERGENCY MEDICINE

## 2025-04-15 PROCEDURE — 80307 DRUG TEST PRSMV CHEM ANLYZR: CPT | Performed by: EMERGENCY MEDICINE

## 2025-04-15 PROCEDURE — 85025 COMPLETE CBC W/AUTO DIFF WBC: CPT | Performed by: EMERGENCY MEDICINE

## 2025-04-15 PROCEDURE — 99285 EMERGENCY DEPT VISIT HI MDM: CPT | Performed by: EMERGENCY MEDICINE

## 2025-04-15 PROCEDURE — 93005 ELECTROCARDIOGRAM TRACING: CPT | Performed by: EMERGENCY MEDICINE

## 2025-04-15 PROCEDURE — 80179 DRUG ASSAY SALICYLATE: CPT | Performed by: EMERGENCY MEDICINE

## 2025-04-15 PROCEDURE — 80053 COMPREHEN METABOLIC PANEL: CPT | Performed by: EMERGENCY MEDICINE

## 2025-04-15 PROCEDURE — 83735 ASSAY OF MAGNESIUM: CPT | Performed by: EMERGENCY MEDICINE

## 2025-04-15 RX ORDER — ALUMINA, MAGNESIA, AND SIMETHICONE 2400; 2400; 240 MG/30ML; MG/30ML; MG/30ML
15 SUSPENSION ORAL EVERY 6 HOURS PRN
Status: DISCONTINUED | OUTPATIENT
Start: 2025-04-15 | End: 2025-04-16 | Stop reason: HOSPADM

## 2025-04-15 RX ORDER — SODIUM CHLORIDE 0.9 % (FLUSH) 0.9 %
10 SYRINGE (ML) INJECTION AS NEEDED
Status: DISCONTINUED | OUTPATIENT
Start: 2025-04-15 | End: 2025-04-15 | Stop reason: HOSPADM

## 2025-04-15 RX ORDER — CYCLOBENZAPRINE HCL 10 MG
1 TABLET ORAL 3 TIMES DAILY PRN
COMMUNITY

## 2025-04-15 RX ORDER — DULOXETIN HYDROCHLORIDE 30 MG/1
1 CAPSULE, DELAYED RELEASE ORAL DAILY
COMMUNITY
Start: 2025-03-19

## 2025-04-15 RX ORDER — GABAPENTIN 400 MG/1
800 CAPSULE ORAL 3 TIMES DAILY
Status: DISCONTINUED | OUTPATIENT
Start: 2025-04-15 | End: 2025-04-16 | Stop reason: HOSPADM

## 2025-04-15 RX ORDER — SENNOSIDES 8.6 MG
650 CAPSULE ORAL EVERY 8 HOURS PRN
COMMUNITY

## 2025-04-15 RX ORDER — DULOXETIN HYDROCHLORIDE 60 MG/1
60 CAPSULE, DELAYED RELEASE ORAL 2 TIMES DAILY
COMMUNITY
Start: 2025-04-15

## 2025-04-15 RX ORDER — CELECOXIB 100 MG/1
CAPSULE ORAL
COMMUNITY
Start: 2025-03-19

## 2025-04-15 RX ORDER — CYCLOBENZAPRINE HCL 10 MG
10 TABLET ORAL 2 TIMES DAILY PRN
Status: DISCONTINUED | OUTPATIENT
Start: 2025-04-15 | End: 2025-04-16 | Stop reason: HOSPADM

## 2025-04-15 RX ORDER — LOPERAMIDE HYDROCHLORIDE 2 MG/1
2 CAPSULE ORAL
Status: DISCONTINUED | OUTPATIENT
Start: 2025-04-15 | End: 2025-04-16 | Stop reason: HOSPADM

## 2025-04-15 RX ORDER — DULOXETIN HYDROCHLORIDE 30 MG/1
60 CAPSULE, DELAYED RELEASE ORAL DAILY
Status: DISCONTINUED | OUTPATIENT
Start: 2025-04-16 | End: 2025-04-16 | Stop reason: HOSPADM

## 2025-04-15 RX ORDER — HYDROXYZINE HYDROCHLORIDE 25 MG/1
50 TABLET, FILM COATED ORAL EVERY 6 HOURS PRN
Status: DISCONTINUED | OUTPATIENT
Start: 2025-04-15 | End: 2025-04-16 | Stop reason: HOSPADM

## 2025-04-15 RX ORDER — GABAPENTIN 800 MG/1
1 TABLET ORAL 3 TIMES DAILY
COMMUNITY
Start: 2025-03-21

## 2025-04-15 RX ORDER — LIDOCAINE 4 G/G
1 PATCH TOPICAL
Status: DISCONTINUED | OUTPATIENT
Start: 2025-04-16 | End: 2025-04-16 | Stop reason: HOSPADM

## 2025-04-15 RX ORDER — CELECOXIB 100 MG/1
100 CAPSULE ORAL EVERY 12 HOURS PRN
Status: DISCONTINUED | OUTPATIENT
Start: 2025-04-15 | End: 2025-04-16 | Stop reason: HOSPADM

## 2025-04-15 RX ORDER — LIDOCAINE 50 MG/G
1 PATCH TOPICAL EVERY 24 HOURS
COMMUNITY

## 2025-04-15 RX ORDER — METOPROLOL SUCCINATE 25 MG/1
25 TABLET, EXTENDED RELEASE ORAL
Status: DISCONTINUED | OUTPATIENT
Start: 2025-04-16 | End: 2025-04-16 | Stop reason: HOSPADM

## 2025-04-15 RX ORDER — ATORVASTATIN CALCIUM 20 MG/1
40 TABLET, FILM COATED ORAL DAILY
Status: DISCONTINUED | OUTPATIENT
Start: 2025-04-16 | End: 2025-04-16 | Stop reason: HOSPADM

## 2025-04-15 RX ORDER — POLYETHYLENE GLYCOL 3350 17 G/17G
17 POWDER, FOR SOLUTION ORAL DAILY PRN
Status: DISCONTINUED | OUTPATIENT
Start: 2025-04-15 | End: 2025-04-16 | Stop reason: HOSPADM

## 2025-04-15 RX ORDER — TRAZODONE HYDROCHLORIDE 50 MG/1
50 TABLET ORAL NIGHTLY PRN
Status: DISCONTINUED | OUTPATIENT
Start: 2025-04-15 | End: 2025-04-16 | Stop reason: HOSPADM

## 2025-04-15 RX ORDER — ACETAMINOPHEN 325 MG/1
650 TABLET ORAL EVERY 4 HOURS PRN
Status: DISCONTINUED | OUTPATIENT
Start: 2025-04-15 | End: 2025-04-16 | Stop reason: HOSPADM

## 2025-04-15 RX ADMIN — GABAPENTIN 800 MG: 400 CAPSULE ORAL at 21:02

## 2025-04-15 RX ADMIN — TRAZODONE HYDROCHLORIDE 50 MG: 50 TABLET ORAL at 21:02

## 2025-04-15 RX ADMIN — CYCLOBENZAPRINE 10 MG: 10 TABLET, FILM COATED ORAL at 21:03

## 2025-04-15 NOTE — NURSING NOTE
Called House Supervisor to see if he was available to come to [facility] so nurse could go to CDU to get new patient. House Supervisor in ED with another patient at this time.
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
## 2025-04-16 VITALS
TEMPERATURE: 98.1 F | WEIGHT: 186 LBS | DIASTOLIC BLOOD PRESSURE: 84 MMHG | OXYGEN SATURATION: 96 % | BODY MASS INDEX: 27.55 KG/M2 | HEIGHT: 69 IN | RESPIRATION RATE: 18 BRPM | HEART RATE: 82 BPM | SYSTOLIC BLOOD PRESSURE: 119 MMHG

## 2025-04-16 PROCEDURE — 99235 HOSP IP/OBS SAME DATE MOD 70: CPT | Performed by: PSYCHIATRY & NEUROLOGY

## 2025-04-16 RX ADMIN — METOPROLOL SUCCINATE 25 MG: 25 TABLET, EXTENDED RELEASE ORAL at 08:42

## 2025-04-16 RX ADMIN — GABAPENTIN 800 MG: 400 CAPSULE ORAL at 08:42

## 2025-04-16 RX ADMIN — LIDOCAINE 1 PATCH: 4 PATCH TOPICAL at 08:42

## 2025-04-16 RX ADMIN — ATORVASTATIN CALCIUM 40 MG: 20 TABLET, FILM COATED ORAL at 08:42

## 2025-04-16 RX ADMIN — DULOXETINE HYDROCHLORIDE 60 MG: 30 CAPSULE, DELAYED RELEASE ORAL at 08:42

## 2025-04-16 NOTE — THERAPY EVALUATION
Patient declined. Patient met with provider and she was agreeable to let patient discharge AMA due to no safety concerns. Although the patient has potential to benefit from the inpatient level of care, the patient is not agreeable to remain for an acute admission at this time. Patient does not present with criteria to warrant an involuntary psychiatric hold at this time as they are not endorsing active suicidal ideation with plan/intent, homicidal ideation with plan/intent or displaying symptoms of an acute psychotic episode without ability to appropriately plan for safety at a lesser restrictive level of care. Safety plan of report to nearest hospital, calling local police or 911 if feeling unsafe, if having suicidal or homicidal thoughts, or if in emergent need of medications verbally reviewed with patient during assessment and suicide prevention/crisis hotlines verbally reviewed with patient during assessment. Patient during assessment verbally agreed to safety plan. Reviewed resources of crisis hotlines or presenting to the nearest emergency department should symptoms worsen, or in any crisis/emergency.
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
## 2025-04-16 NOTE — DISCHARGE SUMMARY
Inpatient Psychiatry [facility] Discharge Summary    (See admission note with same date.)    Date of Discharge:  4/16/2025    Discharge Diagnosis:Principal Problem:    Suicidal ideations        Hospital Course:  Patient was admitted for safety and stabilization.   Routine labs were checked.  Patient was assigned a master's level therapist and provided with an opportunity to participate in group and individual therapy and counseling on the unit.    59 years old male reports history depression and suicidal ideations come and go.  He denies having any history of acting on these thoughts. He denies having any thoughts, plans, or intent to act in anyway to harm himself or other people. He is stating that he would like to be discharged from the psychiatric unit.  He is not interested in medication changes for participating in the therapeutic activities on the unit.  He tells me he has never been admitted to a psychiatric hospital in the past.  He tells me that he wants to return to [place] and plans to stay with some friends.  He says he knows how to get a hold of his outpatient providers and plans to follow up to clarify when his next appointment with them will be.  He feels safe and would like to leave at this time.  The patient understands he is leaving [facility].    Consults:   Consults       No orders found for last 30 day(s).            Labs:  Lab Results (all)       None            Imaging:  Imaging Results (All)       None              Condition on Discharge:  Stable    Vital Signs  Temp:  [97.8 °F (36.6 °C)-98.1 °F (36.7 °C)] 98.1 °F (36.7 °C)  Heart Rate:  [53-82] 82  Resp:  [18-20] 18  BP: (119-169)/(84-94) 119/84    Discharge Disposition  Left Against Medical Advice    Discharge Medications     Discharge Medications        ASK your doctor about these medications        Instructions Start Date   acetaminophen 650 MG 8 hr tablet  Commonly known as: TYLENOL   650 mg, Every 8 Hours PRN      amLODIPine 10 MG tablet  Commonly  known as: NORVASC   1 tablet, Daily      atorvastatin 40 MG tablet  Commonly known as: LIPITOR   40 mg, Daily      celecoxib 100 MG capsule  Commonly known as: CeleBREX   TAKE 1 CAPSULE BY MOUTH IN THE MORNING FOR BACK PAIN, UP TO EVERY 12 HOURS IF NEEDED      cyclobenzaprine 10 MG tablet  Commonly known as: FLEXERIL   1 tablet, 3 Times Daily PRN      DULoxetine 30 MG capsule  Commonly known as: CYMBALTA   1 capsule, Daily      DULoxetine 60 MG capsule  Commonly known as: CYMBALTA   60 mg, 2 Times Daily      famotidine 20 MG tablet  Commonly known as: PEPCID   TAKE 1 TABLET BY MOUTH EVERY DAY AT BEDTIME - MAY TAKE UP TO TWICE DAILY IF NEEDED/AS DIRECTED      gabapentin 800 MG tablet  Commonly known as: NEURONTIN   1 tablet, 3 times daily      hydroCHLOROthiazide 12.5 MG capsule  Commonly known as: MICROZIDE   12.5 mg, Daily      lidocaine 5 %  Commonly known as: LIDODERM   1 patch, Every 24 Hours      lisinopril 5 MG tablet  Commonly known as: PRINIVIL,ZESTRIL   5 mg, Daily      metoprolol succinate XL 25 MG 24 hr tablet  Commonly known as: TOPROL-XL   25 mg, Daily      metroNIDAZOLE 500 MG tablet  Commonly known as: FLAGYL   1 tablet, 3 times daily      omeprazole 40 MG capsule  Commonly known as: priLOSEC   TAKE 1 CAPSULE BY MOUTH ONCE DAILY 30 MINUTES BEFORE FIRST MEAL      ondansetron ODT 4 MG disintegrating tablet  Commonly known as: ZOFRAN-ODT   dissolve 1 tablet in mouth every 8 hours as needed for nausea               Discharge Diet: Regular    Activity at Discharge: As tolerated    Follow-up Appointments:    No future appointments.  Pt reports he knows how to get a hold of his outpatient providers and plans to do that after leaving to be sure he knows when his next appointment is scheduled.      Time: I spent  >30 minutes on this discharge activity which included: face-to-face encounter with the patient, reviewing the data in the system, coordination of the care with the nursing staff as well as  consultants, documentation, and entering orders.    Nona York MD

## 2025-04-16 NOTE — SIGNIFICANT NOTE
04/16/25 1231   Pertinent Diagnosis/Presenting Problems   Presenting Problems/Reason for Referral Suicidal ideation   Therapeutic Recreation Participation   Recreation Therapy Summary of Participation Patient declined to participate in assessment. Patient requested to leave AMA.

## 2025-04-16 NOTE — SIGNIFICANT NOTE
04/16/25 1109   Group Therapy Session   Group Attendance attended group session   Time Session Began 0900   Time Session Ended 1000   Total Time (minutes) 60   Group Type recreation   Group Topic Covered balanced lifestyle;goal setting   Group Session Detail Patient participated in activity and discussion about how to balance limited mental energy and how to prioritize needs over wants.   Patient Participation/Contribution did not share thoughts verbally   Affect During Group aloof   Degree of Insight low

## 2025-04-16 NOTE — PLAN OF CARE
Goal Outcome Evaluation:  Plan of Care Reviewed With: patient  Plan of Care Reviewed With: patient  Patient Agreement with Plan of Care: agrees     Progress: no change  Outcome Evaluation: Pt new admit to [facility]. Upon admission assessment pt denies anxiety and depression. Denies SI/HI and A/V mustafa. Pt received PRN flexeril and trazodone. C/o back pain 10/10. No further complaints voiced. Continue current plan of care.

## 2025-04-16 NOTE — SIGNIFICANT NOTE
04/16/25 1211   Group Therapy Session   Group Attendance attended group session   Time Session Began 1130   Time Session Ended 1200   Total Time (minutes) 30   Group Type recreation   Group Topic Covered leisure exploration/use of leisure time;relaxation techniques   Group Session Detail Patient participated in activity to promote exploring a new hobby and potential way to practice relaxation skills for emotional regulation.   Patient Participation/Contribution did not share thoughts verbally   Affect During Group affect consistent with mood;appropriate to situation   Degree of Insight low

## 2025-04-16 NOTE — NURSING NOTE
Pts personal belongings returned. AMA paper signed. Pt walked to North entrance to exit the building.
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
## 2025-04-16 NOTE — PLAN OF CARE
Goal Outcome Evaluation:  Plan of Care Reviewed With: patient  Plan of Care Reviewed With: patient  Patient Agreement with Plan of Care: other (see comments)  Consent Given to Review Plan with: Pt to leave AMA  Progress: no change                              Pt denies SI/HI/AVH. Pt reports his anxiety is 0/10 and                  depression is 0/10. Pt reports to  that he would like to leave and feels safe to do so. Pr /Jaylen pt can leave AMA.

## 2025-05-07 ENCOUNTER — HOSPITAL ENCOUNTER (INPATIENT)
Facility: HOSPITAL | Age: 60
LOS: 8 days | Discharge: PSYCHIATRIC OR SUBSTANCE USE FACILITY | DRG: 885 | End: 2025-05-15
Attending: PSYCHIATRY & NEUROLOGY | Admitting: PSYCHIATRY & NEUROLOGY
Payer: MEDICAID

## 2025-05-07 ENCOUNTER — HOSPITAL ENCOUNTER (EMERGENCY)
Facility: HOSPITAL | Age: 60
Discharge: PSYCHIATRIC OR SUBSTANCE USE FACILITY | DRG: 885 | End: 2025-05-07
Attending: STUDENT IN AN ORGANIZED HEALTH CARE EDUCATION/TRAINING PROGRAM | Admitting: STUDENT IN AN ORGANIZED HEALTH CARE EDUCATION/TRAINING PROGRAM
Payer: MEDICAID

## 2025-05-07 VITALS
HEART RATE: 98 BPM | OXYGEN SATURATION: 100 % | TEMPERATURE: 98.2 F | RESPIRATION RATE: 17 BRPM | HEIGHT: 69 IN | WEIGHT: 185.19 LBS | BODY MASS INDEX: 27.43 KG/M2 | DIASTOLIC BLOOD PRESSURE: 68 MMHG | SYSTOLIC BLOOD PRESSURE: 124 MMHG

## 2025-05-07 DIAGNOSIS — R45.851 SUICIDAL IDEATION: Primary | ICD-10-CM

## 2025-05-07 DIAGNOSIS — R44.0 AUDITORY HALLUCINATIONS: ICD-10-CM

## 2025-05-07 DIAGNOSIS — R45.850 HOMICIDAL IDEATION: ICD-10-CM

## 2025-05-07 LAB
ALBUMIN SERPL-MCNC: 4.3 G/DL (ref 3.5–5.2)
ALBUMIN/GLOB SERPL: 1.1 G/DL
ALP SERPL-CCNC: 142 U/L (ref 39–117)
ALT SERPL W P-5'-P-CCNC: 9 U/L (ref 1–41)
AMPHET+METHAMPHET UR QL: POSITIVE
AMPHETAMINES UR QL: POSITIVE
ANION GAP SERPL CALCULATED.3IONS-SCNC: 14.7 MMOL/L (ref 5–15)
APAP SERPL-MCNC: <5 MCG/ML (ref 0–30)
AST SERPL-CCNC: 17 U/L (ref 1–40)
BARBITURATES UR QL SCN: NEGATIVE
BASOPHILS # BLD AUTO: 0.04 10*3/MM3 (ref 0–0.2)
BASOPHILS NFR BLD AUTO: 0.6 % (ref 0–1.5)
BENZODIAZ UR QL SCN: NEGATIVE
BILIRUB SERPL-MCNC: 0.3 MG/DL (ref 0–1.2)
BILIRUB UR QL STRIP: NEGATIVE
BUN SERPL-MCNC: 13 MG/DL (ref 6–20)
BUN/CREAT SERPL: 11.3 (ref 7–25)
BUPRENORPHINE SERPL-MCNC: NEGATIVE NG/ML
CALCIUM SPEC-SCNC: 9.6 MG/DL (ref 8.6–10.5)
CANNABINOIDS SERPL QL: POSITIVE
CHLORIDE SERPL-SCNC: 95 MMOL/L (ref 98–107)
CLARITY UR: CLEAR
CO2 SERPL-SCNC: 24.3 MMOL/L (ref 22–29)
COCAINE UR QL: NEGATIVE
COLOR UR: YELLOW
CREAT SERPL-MCNC: 1.15 MG/DL (ref 0.76–1.27)
DEPRECATED RDW RBC AUTO: 45.7 FL (ref 37–54)
EGFRCR SERPLBLD CKD-EPI 2021: 73.8 ML/MIN/1.73
EOSINOPHIL # BLD AUTO: 0.04 10*3/MM3 (ref 0–0.4)
EOSINOPHIL NFR BLD AUTO: 0.6 % (ref 0.3–6.2)
ERYTHROCYTE [DISTWIDTH] IN BLOOD BY AUTOMATED COUNT: 15.5 % (ref 12.3–15.4)
ETHANOL BLD-MCNC: <10 MG/DL (ref 0–10)
ETHANOL UR QL: <0.01 %
FENTANYL UR-MCNC: NEGATIVE NG/ML
GLOBULIN UR ELPH-MCNC: 3.9 GM/DL
GLUCOSE SERPL-MCNC: 119 MG/DL (ref 65–99)
GLUCOSE UR STRIP-MCNC: NEGATIVE MG/DL
HCT VFR BLD AUTO: 41.3 % (ref 37.5–51)
HGB BLD-MCNC: 13.6 G/DL (ref 13–17.7)
HGB UR QL STRIP.AUTO: NEGATIVE
HOLD SPECIMEN: NORMAL
HOLD SPECIMEN: NORMAL
IMM GRANULOCYTES # BLD AUTO: 0.02 10*3/MM3 (ref 0–0.05)
IMM GRANULOCYTES NFR BLD AUTO: 0.3 % (ref 0–0.5)
KETONES UR QL STRIP: NEGATIVE
LEUKOCYTE ESTERASE UR QL STRIP.AUTO: NEGATIVE
LYMPHOCYTES # BLD AUTO: 2.3 10*3/MM3 (ref 0.7–3.1)
LYMPHOCYTES NFR BLD AUTO: 35.9 % (ref 19.6–45.3)
MAGNESIUM SERPL-MCNC: 2.3 MG/DL (ref 1.6–2.6)
MCH RBC QN AUTO: 26.8 PG (ref 26.6–33)
MCHC RBC AUTO-ENTMCNC: 32.9 G/DL (ref 31.5–35.7)
MCV RBC AUTO: 81.5 FL (ref 79–97)
METHADONE UR QL SCN: NEGATIVE
MONOCYTES # BLD AUTO: 0.3 10*3/MM3 (ref 0.1–0.9)
MONOCYTES NFR BLD AUTO: 4.7 % (ref 5–12)
NEUTROPHILS NFR BLD AUTO: 3.71 10*3/MM3 (ref 1.7–7)
NEUTROPHILS NFR BLD AUTO: 57.9 % (ref 42.7–76)
NITRITE UR QL STRIP: NEGATIVE
NRBC BLD AUTO-RTO: 0 /100 WBC (ref 0–0.2)
OPIATES UR QL: NEGATIVE
OXYCODONE UR QL SCN: NEGATIVE
PCP UR QL SCN: NEGATIVE
PH UR STRIP.AUTO: 6.5 [PH] (ref 5–8)
PLATELET # BLD AUTO: 238 10*3/MM3 (ref 140–450)
PMV BLD AUTO: 9.1 FL (ref 6–12)
POTASSIUM SERPL-SCNC: 3.6 MMOL/L (ref 3.5–5.2)
PROT SERPL-MCNC: 8.2 G/DL (ref 6–8.5)
PROT UR QL STRIP: NEGATIVE
RBC # BLD AUTO: 5.07 10*6/MM3 (ref 4.14–5.8)
SALICYLATES SERPL-MCNC: <0.3 MG/DL
SODIUM SERPL-SCNC: 134 MMOL/L (ref 136–145)
SP GR UR STRIP: <=1.005 (ref 1–1.03)
TRICYCLICS UR QL SCN: NEGATIVE
TSH SERPL DL<=0.05 MIU/L-ACNC: 1.17 UIU/ML (ref 0.27–4.2)
UROBILINOGEN UR QL STRIP: NORMAL
WBC NRBC COR # BLD AUTO: 6.41 10*3/MM3 (ref 3.4–10.8)
WHOLE BLOOD HOLD COAG: NORMAL
WHOLE BLOOD HOLD SPECIMEN: NORMAL

## 2025-05-07 PROCEDURE — 80179 DRUG ASSAY SALICYLATE: CPT | Performed by: STUDENT IN AN ORGANIZED HEALTH CARE EDUCATION/TRAINING PROGRAM

## 2025-05-07 PROCEDURE — 93005 ELECTROCARDIOGRAM TRACING: CPT | Performed by: STUDENT IN AN ORGANIZED HEALTH CARE EDUCATION/TRAINING PROGRAM

## 2025-05-07 PROCEDURE — 80053 COMPREHEN METABOLIC PANEL: CPT | Performed by: STUDENT IN AN ORGANIZED HEALTH CARE EDUCATION/TRAINING PROGRAM

## 2025-05-07 PROCEDURE — 82077 ASSAY SPEC XCP UR&BREATH IA: CPT | Performed by: STUDENT IN AN ORGANIZED HEALTH CARE EDUCATION/TRAINING PROGRAM

## 2025-05-07 PROCEDURE — 84443 ASSAY THYROID STIM HORMONE: CPT | Performed by: STUDENT IN AN ORGANIZED HEALTH CARE EDUCATION/TRAINING PROGRAM

## 2025-05-07 PROCEDURE — 83735 ASSAY OF MAGNESIUM: CPT | Performed by: STUDENT IN AN ORGANIZED HEALTH CARE EDUCATION/TRAINING PROGRAM

## 2025-05-07 PROCEDURE — 80307 DRUG TEST PRSMV CHEM ANLYZR: CPT | Performed by: STUDENT IN AN ORGANIZED HEALTH CARE EDUCATION/TRAINING PROGRAM

## 2025-05-07 PROCEDURE — 80143 DRUG ASSAY ACETAMINOPHEN: CPT | Performed by: STUDENT IN AN ORGANIZED HEALTH CARE EDUCATION/TRAINING PROGRAM

## 2025-05-07 PROCEDURE — 81003 URINALYSIS AUTO W/O SCOPE: CPT | Performed by: STUDENT IN AN ORGANIZED HEALTH CARE EDUCATION/TRAINING PROGRAM

## 2025-05-07 PROCEDURE — 85025 COMPLETE CBC W/AUTO DIFF WBC: CPT | Performed by: STUDENT IN AN ORGANIZED HEALTH CARE EDUCATION/TRAINING PROGRAM

## 2025-05-07 PROCEDURE — 36415 COLL VENOUS BLD VENIPUNCTURE: CPT

## 2025-05-07 PROCEDURE — 99285 EMERGENCY DEPT VISIT HI MDM: CPT | Performed by: STUDENT IN AN ORGANIZED HEALTH CARE EDUCATION/TRAINING PROGRAM

## 2025-05-07 RX ORDER — NICOTINE 21 MG/24HR
1 PATCH, TRANSDERMAL 24 HOURS TRANSDERMAL EVERY 24 HOURS
Status: DISCONTINUED | OUTPATIENT
Start: 2025-05-07 | End: 2025-05-07

## 2025-05-07 RX ORDER — ALUMINA, MAGNESIA, AND SIMETHICONE 2400; 2400; 240 MG/30ML; MG/30ML; MG/30ML
15 SUSPENSION ORAL EVERY 6 HOURS PRN
Status: DISCONTINUED | OUTPATIENT
Start: 2025-05-07 | End: 2025-05-15 | Stop reason: HOSPADM

## 2025-05-07 RX ORDER — BENZTROPINE MESYLATE 1 MG/ML
1 INJECTION, SOLUTION INTRAMUSCULAR; INTRAVENOUS DAILY PRN
Status: DISCONTINUED | OUTPATIENT
Start: 2025-05-07 | End: 2025-05-15 | Stop reason: HOSPADM

## 2025-05-07 RX ORDER — TRAZODONE HYDROCHLORIDE 50 MG/1
50 TABLET ORAL NIGHTLY PRN
Status: DISCONTINUED | OUTPATIENT
Start: 2025-05-07 | End: 2025-05-10

## 2025-05-07 RX ORDER — LOPERAMIDE HYDROCHLORIDE 2 MG/1
2 CAPSULE ORAL
Status: DISCONTINUED | OUTPATIENT
Start: 2025-05-07 | End: 2025-05-15 | Stop reason: HOSPADM

## 2025-05-07 RX ORDER — HYDROXYZINE HYDROCHLORIDE 25 MG/1
50 TABLET, FILM COATED ORAL EVERY 6 HOURS PRN
Status: DISCONTINUED | OUTPATIENT
Start: 2025-05-07 | End: 2025-05-15 | Stop reason: HOSPADM

## 2025-05-07 RX ORDER — ACETAMINOPHEN 325 MG/1
650 TABLET ORAL EVERY 4 HOURS PRN
Status: DISCONTINUED | OUTPATIENT
Start: 2025-05-07 | End: 2025-05-15 | Stop reason: HOSPADM

## 2025-05-07 RX ORDER — BENZTROPINE MESYLATE 1 MG/1
2 TABLET ORAL DAILY PRN
Status: DISCONTINUED | OUTPATIENT
Start: 2025-05-07 | End: 2025-05-15 | Stop reason: HOSPADM

## 2025-05-07 RX ORDER — NICOTINE 21 MG/24HR
1 PATCH, TRANSDERMAL 24 HOURS TRANSDERMAL EVERY 24 HOURS
Status: DISCONTINUED | OUTPATIENT
Start: 2025-05-07 | End: 2025-05-07 | Stop reason: SDUPTHER

## 2025-05-07 RX ORDER — POLYETHYLENE GLYCOL 3350 17 G/17G
17 POWDER, FOR SOLUTION ORAL DAILY PRN
Status: DISCONTINUED | OUTPATIENT
Start: 2025-05-07 | End: 2025-05-15 | Stop reason: HOSPADM

## 2025-05-07 RX ADMIN — TRAZODONE HYDROCHLORIDE 50 MG: 50 TABLET ORAL at 20:37

## 2025-05-07 RX ADMIN — HYDROXYZINE HYDROCHLORIDE 50 MG: 25 TABLET, FILM COATED ORAL at 20:37

## 2025-05-07 NOTE — PLAN OF CARE
"Goal Outcome Evaluation:                                          Pt is a new admit this shift and has been oriented to the unit and schedule. Pt reports SI with a plan to hang himself if he was not on the [facility] unit. Pt reports he currently has no plan on the unit and feels safe at this time. Pt reports his anxiety is 10/10 and depression is 10/10. Pt denies HI ans states that will only be an issue if \" if someone yells at me\". Pt reports AVH of people, skulls, colors, hearing vehicles. Pts care plan is ongoing  "

## 2025-05-07 NOTE — NURSING NOTE
Pt admitted to [facility] from [facility].  Skin search and metal wanding completed while in CDU by myself and chaperoned by PCT.  Pt escorted to unit via WC by myself and security.
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
## 2025-05-08 PROCEDURE — 99223 1ST HOSP IP/OBS HIGH 75: CPT | Performed by: PSYCHIATRY & NEUROLOGY

## 2025-05-08 RX ORDER — PANTOPRAZOLE SODIUM 40 MG/1
40 TABLET, DELAYED RELEASE ORAL
Status: DISCONTINUED | OUTPATIENT
Start: 2025-05-09 | End: 2025-05-15 | Stop reason: HOSPADM

## 2025-05-08 RX ORDER — CELECOXIB 100 MG/1
100 CAPSULE ORAL 2 TIMES DAILY PRN
Status: DISCONTINUED | OUTPATIENT
Start: 2025-05-08 | End: 2025-05-15 | Stop reason: HOSPADM

## 2025-05-08 RX ORDER — GABAPENTIN 400 MG/1
800 CAPSULE ORAL EVERY 12 HOURS SCHEDULED
Status: DISCONTINUED | OUTPATIENT
Start: 2025-05-08 | End: 2025-05-15 | Stop reason: HOSPADM

## 2025-05-08 RX ORDER — DULOXETIN HYDROCHLORIDE 30 MG/1
60 CAPSULE, DELAYED RELEASE ORAL 2 TIMES DAILY
Status: DISCONTINUED | OUTPATIENT
Start: 2025-05-08 | End: 2025-05-15 | Stop reason: HOSPADM

## 2025-05-08 RX ADMIN — GABAPENTIN 800 MG: 400 CAPSULE ORAL at 20:16

## 2025-05-08 RX ADMIN — HYDROXYZINE HYDROCHLORIDE 50 MG: 25 TABLET, FILM COATED ORAL at 20:16

## 2025-05-08 RX ADMIN — TRAZODONE HYDROCHLORIDE 50 MG: 50 TABLET ORAL at 20:16

## 2025-05-08 RX ADMIN — DULOXETINE 60 MG: 30 CAPSULE, DELAYED RELEASE ORAL at 20:16
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
## 2025-05-08 NOTE — PLAN OF CARE
"Goal Outcome Evaluation:  Plan of Care Reviewed With: patient  Plan of Care Reviewed With: patient  Patient Agreement with Plan of Care: agrees     Progress: no change  Outcome Evaluation: Pt endorses SI and has a plan to hang himself but not while he is on the unit. Patient states he hears voices calling him names and \"saying mean things\". He rates anxiety 9/10 and depression 9/10. He complains of 10/10 back pain. Pain management plan reviewed with him but denied PRN medications. Pt reports not sleeping well and his appetite is \"hit or miss\". Home medications discussed with [facility], medications restarted. Started on Buspar for anxiety.                             "
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
## 2025-05-08 NOTE — PLAN OF CARE
Problem: Adult Behavioral Health Plan of Care  Goal: Plan of Care Review  Flowsheets  Taken 5/8/2025 1727 by Gayla Turner  Outcome Evaluation: New admit. Completed social history and integrated summary.  Taken 5/8/2025 0800 by Stephenie Gant, RN  Patient Agreement with Plan of Care: agrees  Taken 5/8/2025 0618 by Lor lPata RN  Plan of Care Reviewed With: patient  Goal: Patient-Specific Goal (Individualization)  Flowsheets  Taken 5/8/2025 1727  Patient Personal Strengths:   no history of violence   motivated for treatment  Patient/Family-Specific Goals (Include Timeframe): Patient will deny SI/HI prior to discharge. Patient will identify 1 healthy coping skill and consent to appropriate aftercare plan prior to discharge.  Individualized Care Needs: Therapist to offer 1-4 therapy sessions, aftercare, planning, safety planning, family education, group therapy, and brief CBT/MI interventions.  Taken 5/8/2025 1706  Patient Vulnerabilities:   adverse childhood experience(s)   substance abuse/addiction   lacks insight into illness  Goal: Optimized Coping Skills in Response to Life Stressors  Intervention: Promote Effective Coping Strategies  Flowsheets (Taken 5/8/2025 0800 by Stephenie Gant, RN)  Supportive Measures:   active listening utilized   verbalization of feelings encouraged   self-care encouraged   positive reinforcement provided  Goal: Develops/Participates in Therapeutic [place] to Support Successful Transition  Intervention: Foster Therapeutic [place]  Flowsheets (Taken 5/8/2025 0800 by Stephenie Gant, RN)  Trust Relationship/Rapport:   care explained   choices provided   thoughts/feelings acknowledged   reassurance provided   questions encouraged   questions answered  Intervention: Mutually Develop Transition Plan  Flowsheets  Taken 5/8/2025 1727  Outpatient/Agency/Support Group Needs:   outpatient medication management   outpatient counseling  Transition Support: follow-up care  discussed  Anticipated Discharge Disposition: homeless  Taken 5/8/2025 4326  Transportation Anticipated: agency  Transportation Concerns: no car  Current Discharge Risk: psychiatric illness  Concerns to be Addressed:   mental health   suicidal  Readmission Within the Last 30 Days: previous discharge plan unsuccessful  Patient/Family Anticipated Services at Transition: none  Patient/Family Anticipates Transition to: shelter   Goal Outcome Evaluation:              Outcome Evaluation: New admit. Completed social history and integrated summary.
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
## 2025-05-08 NOTE — PLAN OF CARE
"Goal Outcome Evaluation:  Plan of Care Reviewed With: patient  Plan of Care Reviewed With: patient  Patient Agreement with Plan of Care: agrees        Outcome Evaluation: pt out on unit, aloof of others. His mood is stable, he is cooperative/pleasant. Pt asked if he was having thoughts to harm himself  and he stated \"yes\" with a smirk on his face. Pt had the same smirk when rating his anxiety and depression 9/10, and endorsed hearing voices. Pt states his plan to harm himself would be by hanging; pt contracts for safety. Pt rates pain 9/10, reports poor appetite, poor sleep. Pt slept the entire shift uninterrupted.                             "

## 2025-05-09 PROCEDURE — 99232 SBSQ HOSP IP/OBS MODERATE 35: CPT | Performed by: PSYCHIATRY & NEUROLOGY

## 2025-05-09 RX ORDER — OLANZAPINE 5 MG/1
10 TABLET, ORALLY DISINTEGRATING ORAL EVERY 8 HOURS PRN
Status: DISCONTINUED | OUTPATIENT
Start: 2025-05-09 | End: 2025-05-15 | Stop reason: HOSPADM

## 2025-05-09 RX ORDER — METOPROLOL SUCCINATE 25 MG/1
25 TABLET, EXTENDED RELEASE ORAL DAILY
Status: DISCONTINUED | OUTPATIENT
Start: 2025-05-09 | End: 2025-05-15 | Stop reason: HOSPADM

## 2025-05-09 RX ADMIN — TRAZODONE HYDROCHLORIDE 50 MG: 50 TABLET ORAL at 21:08

## 2025-05-09 RX ADMIN — METOPROLOL SUCCINATE 25 MG: 25 TABLET, EXTENDED RELEASE ORAL at 13:44

## 2025-05-09 RX ADMIN — CELECOXIB 100 MG: 100 CAPSULE ORAL at 08:53

## 2025-05-09 RX ADMIN — CELECOXIB 100 MG: 100 CAPSULE ORAL at 21:10

## 2025-05-09 RX ADMIN — GABAPENTIN 800 MG: 400 CAPSULE ORAL at 21:08

## 2025-05-09 RX ADMIN — DULOXETINE 60 MG: 30 CAPSULE, DELAYED RELEASE ORAL at 21:08

## 2025-05-09 RX ADMIN — ACETAMINOPHEN 650 MG: 325 TABLET, FILM COATED ORAL at 21:09

## 2025-05-09 RX ADMIN — GABAPENTIN 800 MG: 400 CAPSULE ORAL at 08:53

## 2025-05-09 RX ADMIN — ACETAMINOPHEN 650 MG: 325 TABLET, FILM COATED ORAL at 08:53

## 2025-05-09 RX ADMIN — PANTOPRAZOLE SODIUM 40 MG: 40 TABLET, DELAYED RELEASE ORAL at 08:53

## 2025-05-09 RX ADMIN — DULOXETINE 60 MG: 30 CAPSULE, DELAYED RELEASE ORAL at 08:53

## 2025-05-09 RX ADMIN — HYDROXYZINE HYDROCHLORIDE 50 MG: 25 TABLET, FILM COATED ORAL at 21:08

## 2025-05-09 RX ADMIN — OLANZAPINE 10 MG: 5 TABLET, ORALLY DISINTEGRATING ORAL at 09:21

## 2025-05-09 NOTE — SIGNIFICANT NOTE
05/09/25 1613   Group Therapy Session   Group Attendance excused from group session   Time Session Began 1530   Time Session Ended 1610   Total Time (minutes) 40   Group Type recreation   Group Topic Covered mindfulness;relaxation techniques   Group Session Detail Patient participated in progressive muscle relaxation pratice and discussion about the benefits of a daily meditation routine.   Patient Participation Detail Patient sleeping

## 2025-05-09 NOTE — PLAN OF CARE
"Goal Outcome Evaluation:  Plan of Care Reviewed With: patient  Plan of Care Reviewed With: patient  Patient Agreement with Plan of Care: agrees     Progress: no change  Outcome Evaluation: Pt endorses SI but no plan while on the unit. He denies HI. He reports hearing voices telling him \"you're scared, you're a chicken, hang yourself.\" He began hitting himself in the head during group and said \"the voices wouldn't stop\". One time dose of Zydis 10mg given and patient was allowed to lay down and rest. Home medications were restarted. Continue current plan of care.                             "

## 2025-05-09 NOTE — PROGRESS NOTES
"INPATIENT PSYCHIATRIC PROGRESS NOTE    Name:  Aldo Humphrey  :  1965  MRN:  [id number]  Visit Number:  [id number]  Hospital Day: 2 days    This provider is located at home address on file with [facility]. This visit is being done on behalf of [facility] using a secure platform for a video visit in a secure and private environment. The Patient is located at [facility]-on a locked Behavioral Health unit. The patient's condition being diagnosed/treated is appropriate for telemedicine. The provider identified self as well as credentials. The patient, and/or patient's guardian, consent to being seen remotely, and when consent is given they understand that the consent allows for patient identifiable information to be sent to a third party as needed. They may refuse to be seen remotely at any time. The electronic data is encrypted and password protected, and the patient and/or guardian has been advised of the potential risks to privacy not withstanding such measures.    SUBJECTIVE    CC/Focus of Exam: Psychosis, Meth use, Depression, Suicidal ideations     INTERVAL HISTORY:   Patient seen chart reviewed and case discussed in treatment team. Staff report patient is minimally cooperative.  But is noted to be attending groups today.  However he left group this morning due to increased auditory hallucinations and was noted to be hitting himself in the head in an attempt to make the voices quite.  He accepted Zyprexa due to symptom burden.     He tells me an interview that he has been experiencing hallucinations \"laughing at me and telling me to hang myself\".  He does admit that he uses methamphetamine in the past couple of days.  He states that he also hears voices telling him to use meth.  He reports the voices are louder in his head when he is not using.    PRNs given hydroxyzine  trazodone overnight and Zyprexa this morning      Review of Systems    No dizziness, no " seizures, no headaches, no nausea/vomiting.    OBJECTIVE      PHYSICAL EXAM:  Vital signs: Reviewed and listed below  Gait and station: Steady   Muscle tone/strength: Symmetrical movements of extremities    Temp:  [97.7 °F (36.5 °C)-98.7 °F (37.1 °C)] 97.7 °F (36.5 °C)  Heart Rate:  [64-82] 82  Resp:  [18-20] 18  BP: (120-141)/(89-93) 120/93    MENTAL STATUS EXAM:  Appearance: Casually dressed, fair grooming.   Behavior: Cooperative with interview, good eye contact  Psychomotor: No psychomotor agitation, No EPS reported or observed  Speech: Regular rate, rhythm and tone.  Mood: depressed  Affect: Congruent  Thought Content: no delusional material present  Thought process: generally goal directed  Suicidality: See HPI   Homicidality: No HI  Perception: See HPI  Insight: Limited  Judgment: limited       Lab Results (last 24 hours)       ** No results found for the last 24 hours. **               Imaging Results (Last 24 Hours)       ** No results found for the last 24 hours. **               ECG/EMG Results (most recent)       None             ALLERGIES: Patient has no known allergies.      Current Facility-Administered Medications:     acetaminophen (TYLENOL) tablet 650 mg, 650 mg, Oral, Q4H PRN, Nona York MD, 650 mg at 05/09/25 0853    aluminum-magnesium hydroxide-simethicone (MAALOX MAX) 400-400-40 MG/5ML suspension 15 mL, 15 mL, Oral, Q6H PRN, Nona York MD    benztropine (COGENTIN) tablet 2 mg, 2 mg, Oral, Daily PRN **OR** benztropine (COGENTIN) injection 1 mg, 1 mg, Intramuscular, Daily PRN, Nona York MD    celecoxib (CeleBREX) capsule 100 mg, 100 mg, Oral, BID PRN, Nona York MD, 100 mg at 05/09/25 0853    DULoxetine (CYMBALTA) DR capsule 60 mg, 60 mg, Oral, BID, Nona York MD, 60 mg at 05/09/25 0853    gabapentin (NEURONTIN) capsule 800 mg, 800 mg, Oral, Q12H, Nona York MD, 800 mg at 05/09/25 0853    hydrOXYzine (ATARAX) tablet 50 mg, 50 mg, Oral, Q6H PRN, Nona York MD,  50 mg at 05/08/25 2016    loperamide (IMODIUM) capsule 2 mg, 2 mg, Oral, Q2H PRN, Nona York MD    magnesium hydroxide (MILK OF MAGNESIA) suspension 10 mL, 10 mL, Oral, Daily PRN, Nona York MD    metoprolol succinate XL (TOPROL-XL) 24 hr tablet 25 mg, 25 mg, Oral, Daily, Nona York MD, 25 mg at 05/09/25 1344    OLANZapine zydis (zyPREXA) disintegrating tablet 10 mg, 10 mg, Oral, Q8H PRN, Nona York MD, 10 mg at 05/09/25 0921    pantoprazole (PROTONIX) EC tablet 40 mg, 40 mg, Oral, Q AM, Nona York MD, 40 mg at 05/09/25 0853    polyethylene glycol (MIRALAX) packet 17 g, 17 g, Oral, Daily PRN, Nona York MD    traZODone (DESYREL) tablet 50 mg, 50 mg, Oral, Nightly PRN, Nona York MD, 50 mg at 05/08/25 2016          ASSESSMENT & PLAN:      Progress towards treatment plans and goals: Compliant with medications, attending groups, and individual therapy.  Rationale for continued level of care: Patient continues to need inpatient level of care for stabilization of psychiatric symptoms.  Patient would potentially decompensate further at a lower level of care.       Diagnosis:      Suicidal ideation    Psychosis  Methamphetamine use    Plan:    -Continue hospitalization for safety and stabilization.  -Continue current precautions and safety checks.  -Encourage participation in therapeutic activities on the unit to increase coping skills for stressful life events. Including group and individual therapy.  -Continue to discuss case in treatment team meetings and continue discharge planning.  -Risk/benefit/alternatives to medications have been discussed with the patient.  We will continue to monitor for negative and positive effects to medications.   -Patient agrees with the following plan for today:    Medications:  Start Zyprexa as needed for voices, if tolerated consider scheduling it.        DULoxetine, 60 mg, Oral, BID  gabapentin, 800 mg, Oral, Q12H  metoprolol succinate XL, 25 mg, Oral,  Daily  pantoprazole, 40 mg, Oral, Q AM          I spent 18 minutes before, during and after on this encounter date of service, discussing case with treatment team, reviewing chart, interviewing and evaluating the patient, educating and counseling the patient, assessing patient's immediate risk, weighing risks associated with most appropriate level of care, formulating assessment and plan, documentation, writing orders, and communication with RN and staff.      Psychiatrist:  Nona York MD  05/09/25  18:08 EDT

## 2025-05-09 NOTE — PLAN OF CARE
"Goal Outcome Evaluation:  Plan of Care Reviewed With: patient  Plan of Care Reviewed With: patient  Patient Agreement with Plan of Care: agrees  Consent Given to Review Plan with: pt out on unit aloof of others. Pt endorses SI but contracts for safety; his plan is to hang self. Pt rates depression/anxiety 9/10. Pt endorses hearing voices saying \"mean things\". Pt mood is stable.                "

## 2025-05-09 NOTE — SIGNIFICANT NOTE
05/09/25 1018   Group Therapy Session   Group Attendance attended group session   Time Session Began 0900   Time Session Ended 0930   Total Time (minutes) 30   Group Type recreation   Group Topic Covered coping skills/lifestyle management   Group Session Detail Patient participated in journaling activity and discussion on the benefits of journaling as a coping skill.   Patient Participation/Contribution did not share thoughts verbally;disruptive;refused to participate;closed eyes for most of session   Patient Participation Detail Patient had his head in his arms for part of group. Patient appeared to be shaking and was eventually asked to leave group to stay in the hallway since it was causing a disturbance for other patients.   Affect During Group anxious   Degree of Insight none

## 2025-05-10 PROCEDURE — 99232 SBSQ HOSP IP/OBS MODERATE 35: CPT | Performed by: SPECIALIST

## 2025-05-10 RX ORDER — TRAZODONE HYDROCHLORIDE 50 MG/1
100 TABLET ORAL NIGHTLY PRN
Status: DISCONTINUED | OUTPATIENT
Start: 2025-05-10 | End: 2025-05-15 | Stop reason: HOSPADM

## 2025-05-10 RX ADMIN — DULOXETINE 60 MG: 30 CAPSULE, DELAYED RELEASE ORAL at 08:41

## 2025-05-10 RX ADMIN — GABAPENTIN 800 MG: 400 CAPSULE ORAL at 20:36

## 2025-05-10 RX ADMIN — OLANZAPINE 10 MG: 5 TABLET, ORALLY DISINTEGRATING ORAL at 09:02

## 2025-05-10 RX ADMIN — TRAZODONE HYDROCHLORIDE 100 MG: 50 TABLET ORAL at 20:36

## 2025-05-10 RX ADMIN — PANTOPRAZOLE SODIUM 40 MG: 40 TABLET, DELAYED RELEASE ORAL at 08:41

## 2025-05-10 RX ADMIN — GABAPENTIN 800 MG: 400 CAPSULE ORAL at 08:40

## 2025-05-10 RX ADMIN — ACETAMINOPHEN 650 MG: 325 TABLET, FILM COATED ORAL at 15:18

## 2025-05-10 RX ADMIN — ACETAMINOPHEN 650 MG: 325 TABLET, FILM COATED ORAL at 20:37

## 2025-05-10 RX ADMIN — METOPROLOL SUCCINATE 25 MG: 25 TABLET, EXTENDED RELEASE ORAL at 08:41

## 2025-05-10 RX ADMIN — HYDROXYZINE HYDROCHLORIDE 50 MG: 25 TABLET, FILM COATED ORAL at 20:36

## 2025-05-10 RX ADMIN — ACETAMINOPHEN 650 MG: 325 TABLET, FILM COATED ORAL at 09:02

## 2025-05-10 RX ADMIN — CELECOXIB 100 MG: 100 CAPSULE ORAL at 15:18

## 2025-05-10 RX ADMIN — DULOXETINE 60 MG: 30 CAPSULE, DELAYED RELEASE ORAL at 20:36

## 2025-05-10 NOTE — PROGRESS NOTES
"INPATIENT PSYCHIATRIC PROGRESS NOTE    Name:  Aldo Humphrey  :  1965  MRN:  [id number]  Visit Number:  [id number]  Hospital Day: 3 days    This provider is located at home address on file with [facility]. This visit is being done on behalf of [facility] using a secure platform for a video visit in a secure and private environment. The Patient is located at [facility]-on a locked Behavioral Health unit. The patient's condition being diagnosed/treated is appropriate for telemedicine. The provider identified self as well as credentials. The patient, and/or patient's guardian, consent to being seen remotely, and when consent is given they understand that the consent allows for patient identifiable information to be sent to a third party as needed. They may refuse to be seen remotely at any time. The electronic data is encrypted and password protected, and the patient and/or guardian has been advised of the potential risks to privacy not withstanding such measures.    SUBJECTIVE    CC/Focus of Exam: Depression and anxiety    INTERVAL HISTORY:   Patient seen, chart reviewed, and case discussed in treatment team. Staff reported no major behavioral problems overnight.  Patient attending groups and appropriate with peers and staff on the unit.    PRNs overnight given: Trazodone    On interview today patient tells me that he is not doing very well as he was noticed to be very withdrawn with blunted affect and poor eye contact and was just looking down on the floor and was slow to respond and continues to reports that he does not feel very well and made comments that he has been having suicidal ideations and when asked to elaborate further he stated \"I want to hang myself and I get out of the hospital\".  He denies having any suicidal thoughts or intent or plan here on the unit mentioned that he feels safe here.  When questioned as to what was making him so depressed and " suicidal he was unable to identify any particular triggers or stressors.  He did rate his depression to be 10 and rates his anxiety to be 10 this morning.  He mentions that he has been taking the medications and has been tolerating them fairly well.  He still reports some struggle with sleep despite taking trazodone.  He continues to maintain isolative behavior with psychomotor retardation anhedonia and continues to express feelings of hopelessness and helplessness and has been struggling to show much improvement in his mood and level of functioning.  No episode of agitation and aggression or self-harm behavior has been reported.      Review of Systems    No dizziness, no seizures, no headaches, no nausea/vomiting.    OBJECTIVE      PHYSICAL EXAM:  Vital signs: Reviewed and listed below  Gait and station: Steady   Muscle tone/strength: Symmetrical movements of extremities    Temp:  [97.6 °F (36.4 °C)] 97.6 °F (36.4 °C)  Heart Rate:  [58-82] 58  Resp:  [16] 16  BP: (120-123)/(70-93) 123/70    MENTAL STATUS EXAM:  Appearance: Casually dressed, fair hygiene.   Behavior: Cooperative with interview, good eye contact  Psychomotor: No psychomotor agitation, No EPS reported or observed  Speech: Regular rate, rhythm and tone.  Mood: Depressed and anxious  Affect: Congruent  Thought Content: No delusional material present  Thought process: Generally goal directed  Suicidality: See HPI   Homicidality: No HI  Perception: No AH/VH  Insight: Fair   Judgment: Limited       Lab Results (last 24 hours)       ** No results found for the last 24 hours. **               Imaging Results (Last 24 Hours)       ** No results found for the last 24 hours. **               ECG/EMG Results (most recent)       None             ALLERGIES: Patient has no known allergies.      Current Facility-Administered Medications:     acetaminophen (TYLENOL) tablet 650 mg, 650 mg, Oral, Q4H PRN, Nona York MD, 650 mg at 05/10/25 0902     aluminum-magnesium hydroxide-simethicone (MAALOX MAX) 400-400-40 MG/5ML suspension 15 mL, 15 mL, Oral, Q6H PRN, Nona York MD    benztropine (COGENTIN) tablet 2 mg, 2 mg, Oral, Daily PRN **OR** benztropine (COGENTIN) injection 1 mg, 1 mg, Intramuscular, Daily PRN, Nona York MD    celecoxib (CeleBREX) capsule 100 mg, 100 mg, Oral, BID PRN, Nona York MD, 100 mg at 05/09/25 2110    DULoxetine (CYMBALTA) DR capsule 60 mg, 60 mg, Oral, BID, Nona York MD, 60 mg at 05/10/25 0841    gabapentin (NEURONTIN) capsule 800 mg, 800 mg, Oral, Q12H, Nona York MD, 800 mg at 05/10/25 0840    hydrOXYzine (ATARAX) tablet 50 mg, 50 mg, Oral, Q6H PRN, Nona York MD, 50 mg at 05/09/25 2108    loperamide (IMODIUM) capsule 2 mg, 2 mg, Oral, Q2H PRN, Nnoa York MD    magnesium hydroxide (MILK OF MAGNESIA) suspension 10 mL, 10 mL, Oral, Daily PRN, Nona York MD    metoprolol succinate XL (TOPROL-XL) 24 hr tablet 25 mg, 25 mg, Oral, Daily, Nona York MD, 25 mg at 05/10/25 0841    OLANZapine zydis (zyPREXA) disintegrating tablet 10 mg, 10 mg, Oral, Q8H PRN, Nona York MD, 10 mg at 05/10/25 0902    pantoprazole (PROTONIX) EC tablet 40 mg, 40 mg, Oral, Q AM, Nona York MD, 40 mg at 05/10/25 0841    polyethylene glycol (MIRALAX) packet 17 g, 17 g, Oral, Daily PRN, Nona York MD    traZODone (DESYREL) tablet 100 mg, 100 mg, Oral, Nightly PRN, Marilee White MD          ASSESSMENT & PLAN:      Progress towards treatment plans and goals: Compliant with medications, attending groups, and individual therapy.  Rationale for continued level of care: Patient continues to need inpatient level of care for stabilization of psychiatric symptoms.  Patient would potentially decompensate further at a lower level of care.       Diagnosis:     Major depressive disorder, recurrent, severe, without psychotic features    Generalized anxiety disorder.      Suicidal ideation        Plan:  Patient does show  some persistent depressive symptoms with feelings of hopelessness and helplessness and suicidal ideations and has not been able to show therapeutic response to the medication as antidepressant was recently adjusted and will recommend maintaining on his current level of precaution and continue him on his current medications and we will monitor his response to medications and make further adjustments as needed.    Supportive therapy was provided the patient    Safe, structured and nurturing involvement will be provided    Suicide risk assessment was completed and concerns were noticed    I will continue to follow up      -Continue hospitalization for safety and stabilization.  -Continue current precautions and safety checks.  -Encourage participation in therapeutic activities on the unit to increase coping skills for stressful life events including group and individual therapy.  -Continue to discuss case in treatment team meetings and continue discharge planning.  -Risk/benefit/alternatives to medications have been discussed with the patient.  We will continue to monitor for negative and positive effects to medications.   -Patient agrees with the following plan for today:    Medications:      DULoxetine, 60 mg, Oral, BID  gabapentin, 800 mg, Oral, Q12H  metoprolol succinate XL, 25 mg, Oral, Daily  pantoprazole, 40 mg, Oral, Q AM          I spent 15 minutes before, during and after on this encounter date of service, discussing case with treatment team, reviewing chart, interviewing and evaluating the patient, educating and counseling the patient, assessing patient's immediate risk, weighing risks associated with most appropriate level of care, formulating assessment and plan, documentation, writing orders, and communication with RN and staff.      Psychiatrist:  Marilee White MD  05/10/25  10:00 EDT

## 2025-05-10 NOTE — PLAN OF CARE
Goal Outcome Evaluation:              Outcome Evaluation: Patient has been calm and cooperative on this shift. Patient rates anxiety and depression as a 10/10. Patient continues to endorse SI with a plan to hang himself when he leaves the hospital, but has no plan while on the unit. Patient states that he continues to hear voices telling him to hurt himself. Patient denies HI. Patient recieved PRN tylenol, celebrex and zydis on this shift.

## 2025-05-10 NOTE — THERAPY TREATMENT NOTE
DATA:    Therapist met with the patient individually. Therapist continues reviewing plan of care and aftercare plan. The patient was agreeable.    ASSESSMENT:    Patient was seen for follow up of SI.     Today, patient was seen 1-1 in office. The patient's mood appears appropriate to circumstances, affect congruent, thought content normal. Patient reports sleep is interrupted and appetite is good. On scale 1-10, patient rates depression 10 and anxiety 10. Patient reports that he still hears voice telling him to kill himself. Denies HI. Denies visual hallucinations. Denies other concerns or questions.      CLINICAL MANEUVERING:    Therapist provided safe, secure environment for patient to share. Provided reflective listening and psychoeducation. Assisted patient in processing the above session. Assisted patient in identifying any questions or needs to be addressed today.        Concern was voiced regarding substance use and educated patient on risks associated with use. Patient was advised to abstain from use and educated on community resources that can help with sobriety and recovery.        Plan:     Patient to remain hospitalized this date.      Treatment team will focus efforts on stabilizing patient's acute symptoms while providing education on healthy coping and crisis management to reduce hospitalizations. Patient requires daily psychiatrist evaluation and 24/7 nursing supervision to promote patient safety.     Therapist will offer 1-4 individual sessions, family education, and appropriate referral.     Therapist recommends continued assessment.

## 2025-05-10 NOTE — PLAN OF CARE
"Goal Outcome Evaluation:  Plan of Care Reviewed With: patient  Plan of Care Reviewed With: patient  Patient Agreement with Plan of Care: agrees  Consent Given to Review Plan with: pt out on unit aloof of others. Pt endorses SI but contracts for safety; his plan is to hang self. Pt rates depression/anxiety 9/10. Pt endorses hearing voices saying \"mean things\". Pt mood is stable.     Outcome Evaluation: pt out on unit aloof of peers. Pt endorsed SI with a plan to hang himself, but does contract for safety. Pt rated his anxiety and depression 10/10. Pt likes to watch tv in the day area with his feet up on the table, usually with a snack, and does tend to fall asleep periodically in this position. Pt did not have any acting out behaviors this shift. Pt did shower, receive p.m . meds and went to sleep and slept the majority of the shift.                             "

## 2025-05-11 PROCEDURE — 97161 PT EVAL LOW COMPLEX 20 MIN: CPT

## 2025-05-11 PROCEDURE — 99232 SBSQ HOSP IP/OBS MODERATE 35: CPT | Performed by: SPECIALIST

## 2025-05-11 RX ADMIN — METOPROLOL SUCCINATE 25 MG: 25 TABLET, EXTENDED RELEASE ORAL at 08:55

## 2025-05-11 RX ADMIN — HYDROXYZINE HYDROCHLORIDE 50 MG: 25 TABLET, FILM COATED ORAL at 08:55

## 2025-05-11 RX ADMIN — HYDROXYZINE HYDROCHLORIDE 50 MG: 25 TABLET, FILM COATED ORAL at 20:34

## 2025-05-11 RX ADMIN — ACETAMINOPHEN 650 MG: 325 TABLET, FILM COATED ORAL at 08:55

## 2025-05-11 RX ADMIN — TRAZODONE HYDROCHLORIDE 100 MG: 50 TABLET ORAL at 20:34

## 2025-05-11 RX ADMIN — PANTOPRAZOLE SODIUM 40 MG: 40 TABLET, DELAYED RELEASE ORAL at 08:55

## 2025-05-11 RX ADMIN — GABAPENTIN 800 MG: 400 CAPSULE ORAL at 08:54

## 2025-05-11 RX ADMIN — DULOXETINE 60 MG: 30 CAPSULE, DELAYED RELEASE ORAL at 20:34

## 2025-05-11 RX ADMIN — DULOXETINE 60 MG: 30 CAPSULE, DELAYED RELEASE ORAL at 08:54

## 2025-05-11 RX ADMIN — GABAPENTIN 800 MG: 400 CAPSULE ORAL at 20:34

## 2025-05-11 NOTE — NURSING NOTE
Pt noted by staff to have increased difficulty with ambulation and seen holding on to furniture and walls. Pt reports 10/10 back pain. PRN Tylenol offered and accepted. Walker provided and pt advised to notify nursing staff for assistance getting out of bed and using walker overnight. Dr. White notified. Pt consult requested for morning.

## 2025-05-11 NOTE — SIGNIFICANT NOTE
05/11/25 1133   Group Therapy Session   Group Attendance attended group session   Time Session Began 1030   Time Session Ended 1130   Total Time (minutes) 60   Group Type recreation   Group Topic Covered balanced lifestyle   Group Session Detail Patient participated in discussion about the benefits of utilizing radical acceptance as a coping skill. Patient used visual aid to help illustrate how radical acceptance works.   Patient Participation/Contribution did not share thoughts verbally;refused to participate;closed eyes for most of session   Degree of Insight none
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
## 2025-05-11 NOTE — PLAN OF CARE
Goal Outcome Evaluation:  Plan of Care Reviewed With: patient           Outcome Evaluation: PT evaluation completed this date with pt presenting sitting in chair with BLE supported on foot rest, sleeping. Pt opened eyes to his name and agreed to PT evaluation. Pt rated pain at 10/10 across his lower back touching his back in the L 5 area. R side slightly more than L. Pt has been using FWW for walking as his pain increases in standing and walking. Pt was independent with all transfers and gait with FWW. Pt amb 200 ft with supervision. Pt was noted to lean heavily on the walker and had decreased heelstrike and push off B, but had a good pace with walker and stride length equal B. Pt did have positive forward flexion test in sitting and standing for restricted R SI joint mobility. Pt also had positive supine to long sit restriction of R SI joint. Pt had negative SLR test and no numbness of tingling into BLE. Therapist performed muscle energy technique to R SI joint with improvement of R SI joint mobility during sitting and standing forward flexion test. Pt had no change in his pain rating of 10/10. Pt education for using log rolling and side lying to sit to prevent twisting of lower back and for ther ex of knee to chest and gentle pelvic rocking in hook lying. Pt needed cues to perform ther ex correctly. Pt ambulated 200 ft back to day room with a slower gait and had 2 episodes where his R knee did not fullly extend during stance. Pt's pain post gait at 10/10. Nursing informed of pt's education on log rolling, knee to chest, and pelvic rocking exercise. Pt presents with decreased independence with gait and balance due to increased back pain. Pt is expected to improve his mobility with continued PT services that will focus on low back stretching and exercises.    Anticipated Discharge Disposition (PT): other (see comments) (to be determined)

## 2025-05-11 NOTE — PROGRESS NOTES
INPATIENT PSYCHIATRIC PROGRESS NOTE    Name:  Aldo Humphrey  :  1965  MRN:  [id number]  Visit Number:  [id number]  Hospital Day: 4 days    This provider is located at home address on file with [facility]. This visit is being done on behalf of [facility] using a secure platform for a video visit in a secure and private environment. The Patient is located at [facility]-on a locked Behavioral Health unit. The patient's condition being diagnosed/treated is appropriate for telemedicine. The provider identified self as well as credentials. The patient, and/or patient's guardian, consent to being seen remotely, and when consent is given they understand that the consent allows for patient identifiable information to be sent to a third party as needed. They may refuse to be seen remotely at any time. The electronic data is encrypted and password protected, and the patient and/or guardian has been advised of the potential risks to privacy not withstanding such measures.    SUBJECTIVE    CC/Focus of Exam: Depression and anxiety    INTERVAL HISTORY:   Patient seen, chart reviewed, and case discussed in treatment team. Staff reported no major behavioral problems overnight.  Patient attending groups and appropriate with peers and staff on the unit.    PRNs overnight given: Trazodone    On interview today patient tells me that he is struggling with his mood and emotions and seemed to be very anxious withdrawn and seclusive and isolated as he was sitting across from me with the eyes down on the floor and hands constantly rubbing and was slow to respond and was having difficulty initiating any conversation.  He was just answering questions in few words and reports that that he is doing all right and slept good last night after taking trazodone.  However, he still rates his depression to be 10 and rates his anxiety to be 10 and reports that he has been having suicidal ideations  with a similar report as he mentioned yesterday that he does not feel safe outside of the hospital and feels that if he was to leave the hospital he would hang himself.  Meanwhile, he reports that he feels safe in the hospital and denied denies any intent or plan of suicide while on the unit.  He reports that he has been having some low back pain which has been interfering with his level mobility.  He also reports some auditory and visual hallucinations but denies hallucinations to be command in nature.  He continues to struggle to show much improvement in his mood.  He has been isolating himself and has not been coming out socializing or acting with peers and also appears to be struggling to actively participate in therapy groups and treatment laded activities.  No episode of agitation and aggression or self-harm behavior has been reported.      Review of Systems    No dizziness, no seizures, no headaches, no nausea/vomiting.    OBJECTIVE      PHYSICAL EXAM:  Vital signs: Reviewed and listed below  Gait and station: Steady   Muscle tone/strength: Symmetrical movements of extremities    Temp:  [97.7 °F (36.5 °C)-98.4 °F (36.9 °C)] 97.7 °F (36.5 °C)  Heart Rate:  [64-65] 65  Resp:  [16-18] 16  BP: (111-123)/(68-79) 111/79    MENTAL STATUS EXAM:  Appearance: Casually dressed, fair hygiene.   Behavior: Cooperative with interview, good eye contact  Psychomotor: No psychomotor agitation, No EPS reported or observed  Speech: Regular rate, rhythm and tone.  Mood: Depressed and anxious  Affect: Congruent  Thought Content: No delusional material present  Thought process: Generally goal directed  Suicidality: See HPI   Homicidality: No HI  Perception: No AH/VH  Insight: Fair   Judgment: Limited       Lab Results (last 24 hours)       ** No results found for the last 24 hours. **               Imaging Results (Last 24 Hours)       ** No results found for the last 24 hours. **               ECG/EMG Results (most recent)        None             ALLERGIES: Patient has no known allergies.      Current Facility-Administered Medications:     acetaminophen (TYLENOL) tablet 650 mg, 650 mg, Oral, Q4H PRN, Nona York MD, 650 mg at 05/11/25 0855    aluminum-magnesium hydroxide-simethicone (MAALOX MAX) 400-400-40 MG/5ML suspension 15 mL, 15 mL, Oral, Q6H PRN, Nona York MD    benztropine (COGENTIN) tablet 2 mg, 2 mg, Oral, Daily PRN **OR** benztropine (COGENTIN) injection 1 mg, 1 mg, Intramuscular, Daily PRN, Nona York MD    celecoxib (CeleBREX) capsule 100 mg, 100 mg, Oral, BID PRN, Nona York MD, 100 mg at 05/10/25 1518    DULoxetine (CYMBALTA) DR capsule 60 mg, 60 mg, Oral, BID, Nona York MD, 60 mg at 05/11/25 0854    gabapentin (NEURONTIN) capsule 800 mg, 800 mg, Oral, Q12H, Nona York MD, 800 mg at 05/11/25 0854    hydrOXYzine (ATARAX) tablet 50 mg, 50 mg, Oral, Q6H PRN, Nona York MD, 50 mg at 05/11/25 0855    loperamide (IMODIUM) capsule 2 mg, 2 mg, Oral, Q2H PRN, Nona York MD    magnesium hydroxide (MILK OF MAGNESIA) suspension 10 mL, 10 mL, Oral, Daily PRN, Nona York MD    metoprolol succinate XL (TOPROL-XL) 24 hr tablet 25 mg, 25 mg, Oral, Daily, Nona York MD, 25 mg at 05/11/25 0855    OLANZapine zydis (zyPREXA) disintegrating tablet 10 mg, 10 mg, Oral, Q8H PRN, Nona York MD, 10 mg at 05/10/25 0902    pantoprazole (PROTONIX) EC tablet 40 mg, 40 mg, Oral, Q AM, Nona York MD, 40 mg at 05/11/25 0855    polyethylene glycol (MIRALAX) packet 17 g, 17 g, Oral, Daily PRN, Nona York MD    traZODone (DESYREL) tablet 100 mg, 100 mg, Oral, Nightly PRN, Marilee White MD, 100 mg at 05/10/25 2036          ASSESSMENT & PLAN:      Progress towards treatment plans and goals: Compliant with medications, attending groups, and individual therapy.  Rationale for continued level of care: Patient continues to need inpatient level of care for stabilization of psychiatric symptoms.  Patient  would potentially decompensate further at a lower level of care.       Diagnosis:     Major depressive disorder, recurrent, severe, with psychosis    Generalized anxiety disorder      Suicidal ideation        Plan:  Patient continues to show persistent and high levels of depression and anxiety with feelings of hopelessness and helplessness and suicidal ideations and auditory hallucinations and will recommend maintaining on his current treatment protocol will continue on his current medication will monitor his response to the medications and making further adjustments as needed.    Supportive therapy was provided the patient    Safe, structured and nurturing involvement will be provided    Will continue to follow up.  -Continue hospitalization for safety and stabilization.  -Continue current precautions and safety checks.  -Encourage participation in therapeutic activities on the unit to increase coping skills for stressful life events including group and individual therapy.  -Continue to discuss case in treatment team meetings and continue discharge planning.  -Risk/benefit/alternatives to medications have been discussed with the patient.  We will continue to monitor for negative and positive effects to medications.   -Patient agrees with the following plan for today:    Medications:       DULoxetine, 60 mg, Oral, BID  gabapentin, 800 mg, Oral, Q12H  metoprolol succinate XL, 25 mg, Oral, Daily  pantoprazole, 40 mg, Oral, Q AM          I spent 15 minutes before, during and after on this encounter date of service, discussing case with treatment team, reviewing chart, interviewing and evaluating the patient, educating and counseling the patient, assessing patient's immediate risk, weighing risks associated with most appropriate level of care, formulating assessment and plan, documentation, writing orders, and communication with RN and staff.      Psychiatrist:  Marilee White MD  05/11/25  09:30 EDT

## 2025-05-11 NOTE — PLAN OF CARE
Goal Outcome Evaluation:  Plan of Care Reviewed With: patient  Plan of Care Reviewed With: patient  Patient Agreement with Plan of Care: agrees     Progress: no change  Outcome Evaluation: Pt endorsing SI with no plan while on unit but says would hang himself if he wasn't here. He reports voices telling him to kill himself. He denies HI. [id number]. Depression 10/10. Pt given PRN Atarax and Tylenol for c/o 10/10 back pain. Pt worked with PT and spent majority of day asleep in chair.

## 2025-05-11 NOTE — PLAN OF CARE
Goal Outcome Evaluation:  Plan of Care Reviewed With: patient  Patient Agreement with Plan of Care: agrees  Consent Given to Review Plan with: Pt withdrawn from peers but completes assessment appropriately with staff. Pt reports anxiety of 10/10, depression of 10/10 and denies current SI, HI, and AVH. PRN tylenol, atarax and trazodone given overnight. Pt slept wel. Will continue plan of care.  Progress: no change

## 2025-05-12 PROBLEM — R44.0 AUDITORY HALLUCINATION: Status: ACTIVE | Noted: 2025-05-12

## 2025-05-12 PROBLEM — F33.3 MAJOR DEPRESSIVE DISORDER, RECURRENT EPISODE, SEVERE, WITH PSYCHOSIS: Status: ACTIVE | Noted: 2025-05-12

## 2025-05-12 PROBLEM — F15.10 METHAMPHETAMINE ABUSE: Status: ACTIVE | Noted: 2025-05-12

## 2025-05-12 PROBLEM — F33.3 MAJOR DEPRESSIVE DISORDER, RECURRENT EPISODE, SEVERE, WITH PSYCHOSIS: Status: RESOLVED | Noted: 2025-05-12 | Resolved: 2025-05-12

## 2025-05-12 PROBLEM — F32.9 MAJOR DEPRESSIVE DISORDER: Status: ACTIVE | Noted: 2025-05-12

## 2025-05-12 PROCEDURE — 99232 SBSQ HOSP IP/OBS MODERATE 35: CPT | Performed by: PSYCHIATRY & NEUROLOGY

## 2025-05-12 RX ORDER — OLANZAPINE 5 MG/1
10 TABLET, FILM COATED ORAL NIGHTLY
Status: DISCONTINUED | OUTPATIENT
Start: 2025-05-12 | End: 2025-05-15 | Stop reason: HOSPADM

## 2025-05-12 RX ADMIN — ACETAMINOPHEN 650 MG: 325 TABLET, FILM COATED ORAL at 20:14

## 2025-05-12 RX ADMIN — HYDROXYZINE HYDROCHLORIDE 50 MG: 25 TABLET, FILM COATED ORAL at 20:15

## 2025-05-12 RX ADMIN — OLANZAPINE 10 MG: 5 TABLET, FILM COATED ORAL at 20:15

## 2025-05-12 RX ADMIN — GABAPENTIN 800 MG: 400 CAPSULE ORAL at 20:15

## 2025-05-12 RX ADMIN — PANTOPRAZOLE SODIUM 40 MG: 40 TABLET, DELAYED RELEASE ORAL at 09:18

## 2025-05-12 RX ADMIN — OLANZAPINE 10 MG: 5 TABLET, ORALLY DISINTEGRATING ORAL at 09:18

## 2025-05-12 RX ADMIN — DULOXETINE 60 MG: 30 CAPSULE, DELAYED RELEASE ORAL at 09:18

## 2025-05-12 RX ADMIN — METOPROLOL SUCCINATE 25 MG: 25 TABLET, EXTENDED RELEASE ORAL at 09:18

## 2025-05-12 RX ADMIN — CELECOXIB 100 MG: 100 CAPSULE ORAL at 20:15

## 2025-05-12 RX ADMIN — ACETAMINOPHEN 650 MG: 325 TABLET, FILM COATED ORAL at 09:20

## 2025-05-12 RX ADMIN — GABAPENTIN 800 MG: 400 CAPSULE ORAL at 09:18

## 2025-05-12 RX ADMIN — DULOXETINE 60 MG: 30 CAPSULE, DELAYED RELEASE ORAL at 20:15

## 2025-05-12 NOTE — THERAPY TREATMENT NOTE
"DATA:    Therapist met with the patient individually. Therapist continues reviewing plan of care and aftercare plan. The patient was agreeable.    ASSESSMENT:    Patient was seen for follow up of SI.     Today, patient was seen 1-1 in office. The patient's mood appears appropriate to circumstances, affect congruent, thought content normal. Patient reports sleep is Poor and appetite is Poor. On scale 1-10, patient rates depression 10 and anxiety 10. Patient does/does not report hallucinations: Auditory. Patient reports that he still has suicidal thoughts of hanging himself upon discharge. Denies questions or other concerns. States, \"I just feel like I'm in everybody's way.\"      CLINICAL MANEUVERING:    Therapist provided safe, secure environment for patient to share. Provided reflective listening and psychoeducation. Assisted patient in processing the above session. Assisted patient in identifying any questions or needs to be addressed today.        Concern was voiced regarding substance use and educated patient on risks associated with use. Patient was advised to abstain from use and educated on community resources that can help with sobriety and recovery.        Plan:     Patient to remain hospitalized this date.      Treatment team will focus efforts on stabilizing patient's acute symptoms while providing education on healthy coping and crisis management to reduce hospitalizations. Patient requires daily psychiatrist evaluation and 24/7 nursing supervision to promote patient safety.     Therapist will offer 1-4 individual sessions, family education, and appropriate referral.     Therapist recommends continued assessment.  "

## 2025-05-12 NOTE — PLAN OF CARE
"Goal Outcome Evaluation:  Plan of Care Reviewed With: patient  Patient Agreement with Plan of Care: agrees        Outcome Evaluation: Pt cooperative with assessment. Rates anxiety and depression 10/10. Reports SI no plan while on unit but states \"When I am discharged I am going to go into the woods and hang myself that is my plan for if im not here\".  This nurse encouraged to think about potential places other than here that he felt safe at but pt is adamant that this is the only place he will not carry out his plan. Pt denies HI and A/V mustafa. Pt is ambulating better w/ walker. Pt received PRN trazodone and atarax this shift. No further complaints voiced. Continue current plan of care.                             "

## 2025-05-12 NOTE — SIGNIFICANT NOTE
05/12/25 1100   Pertinent Diagnosis/Presenting Problems   Presenting Problems/Reason for Referral Suicidal ideation   Previous Problems Impacting Recreation Patient reported chronic back pain as a barrier to leisure participation.   Lifestyle/Recreational History/Interest   Profession/Job Unemployed   Current or Previous  Service none   Current Living Situation other (see comments)  (Unhoused)   Transportation Situation walks to nearby locations;no reliable method of transportation   Current Educational Level 7th grade   Support Network Patient reported  no good support network.   Recreational History and Interests   How Important is Recreation to You? low importance   Satisfied With Leisure Lifestyle? no   Participate Regularly in Leisure Activity? no   Are You a Social Person? no   Do You Prefer To Be Alone? yes   Do You Like New Experiences? no   Current Hobbies/Interests   (Patient could not identify leisure interests)   Past Hobbies/Interests   (Patient could not identify past leisure interests)   Barriers To Leisure Activity   Barriers to Leisure Activity mental health concerns;mobility limitations   Mobility Limitations (Barriers to Leisure) mobility limited by pain   Coping/Wellbeing   Describe Yourself Patient described himself as ignorant and stupid.   Personal Strengths Resilience, resourcefulness   Current State of Wellbeing increased stressors;poor coping skills   How Do You Sandy With Life Stressors? Patient reported that he tries to get away from everyone when stressed.   Factors Impacting Current State of Wellbeing coping skills/strategies;increase in stressors;self-esteem;supportive social network or family   Therapeutic Recreation Participation   Recreation Therapy Participation arts/crafts;exercise/fitness;games;meditation;music;puzzles;reading;television/movies/videos;writing/journaling/blogging;trivia   Art/Crafts drawing;painting   Exercise/Fitness strengthening/weight training;group  exercise activity   Games board games;card games;brain games/fitness   Music listening to music   Reading books   Objectives of Recreation Participation motivation and activity level through successful participation;positive attitudes leading to a healthy leisure lifestyle   Orientation to Therapeutic Recreation patient;received explanation of recreation therapy programs;completed therapeutic recreation assessment   Recreation Therapy Summary of Participation Patient pleasant and able to complete assessment. Patient did not identify leisure goals for hospitalization.   Therapeutic Recreation Assessment/Plan   Patient/Family Goal (Therapeutic Recreation) Increase awareness on the mental health benefits of regular leisure participation.  Increase knowledge and utilization of leisure activities as coping skills  Decrease symptom burden through leisure participation  Practice utilizing appropriate social skills   Recreation Therapy Goals/Objectives coping skills/strategies;functional leisure skills;health promotion;health maintenance;leisure awareness;leisure skills/knowledge;leisure participation;problem-solving;relaxation response;self-image, confidence, esteem   Recreation Plan structured leisure participation   Referrals to Community Recreation Programs List available on request

## 2025-05-12 NOTE — SIGNIFICANT NOTE
05/12/25 1214   Group Therapy Session   Group Attendance attended group session   Time Session Began 1130   Time Session Ended 1200   Total Time (minutes) 30   Group Type recreation   Group Topic Covered structured socialization   Group Session Detail Patient participated in discussion identifying changes throughout life.   Patient Participation/Contribution closed eyes for most of session;refused to participate   Affect During Group aloof   Degree of Insight low

## 2025-05-12 NOTE — PLAN OF CARE
"Goal Outcome Evaluation:  Plan of Care Reviewed With: patient  Plan of Care Reviewed With: patient  Patient Agreement with Plan of Care: agrees     Progress: no change                   Pt reports SI and reports he does not have a plan on the unit, but if discharged \" I'll go to the woods and hang myself.\" Pt reports auditory hallucinations that are telling him that he is stupid and to hang himself in the woods. Pt denies HI. Pt has remained calm and cooperative this shift. Pts care plan is ongoing.              "

## 2025-05-12 NOTE — SIGNIFICANT NOTE
05/12/25 1024   Group Therapy Session   Group Attendance attended group session   Time Session Began 0900   Time Session Ended 1000   Total Time (minutes) 60   Group Type recreation   Group Topic Covered cognitive activities   Group Session Detail Patient participated in sudoku activity to promote preserving current cognitive functioning and working on creative problem solving.   Patient Participation/Contribution able to recall/repeat info presented;cooperative with task;discussed personal experience with topic;expressed understanding of topic;listened actively;offered helpful suggestions to peers;organized   Affect During Group affect consistent with mood;appropriate to situation   Degree of Insight moderate

## 2025-05-12 NOTE — PROGRESS NOTES
INPATIENT PSYCHIATRIC PROGRESS NOTE    Name:  Aldo Humphrey  :  1965  MRN:  [id number]  Visit Number:  [id number]  Hospital Day: 5 days    This provider is located at home address on file with [facility]. This visit is being done on behalf of [facility] using a secure platform for a video visit in a secure and private environment. The Patient is located at [facility]-on a locked Behavioral Health unit. The patient's condition being diagnosed/treated is appropriate for telemedicine. The provider identified self as well as credentials. The patient, and/or patient's guardian, consent to being seen remotely, and when consent is given they understand that the consent allows for patient identifiable information to be sent to a third party as needed. They may refuse to be seen remotely at any time. The electronic data is encrypted and password protected, and the patient and/or guardian has been advised of the potential risks to privacy not withstanding such measures.    SUBJECTIVE    CC/Focus of Exam: Voices, suicidal thoughts, depression    INTERVAL HISTORY:   Patient seen chart reviewed and case discussed in treatment team. Staff report patient has been experiencing episodes with increased auditory hallucinations, which have been frustrating for patient to where he hits his head with his hands, and attempt to lessen the voices.  PRNs overnight given: Tylenol, hydroxyzine trazodone    On interview today patient tells me that he continues to hear voices that are quite disturbing to him.  He tells me that they are telling him to end his life.  He also endorses feelings of depression.  His suicidal ideation.  He does not feel as though he would be safe if he were to leave the hospital.  He is agreeable to scheduling Zyprexa which he had been taking as needed over the past couple days.      Review of Systems    No dizziness, no seizures, no headaches, no  nausea/vomiting.    OBJECTIVE      PHYSICAL EXAM:  Vital signs: Reviewed and listed below  Gait and station: Steady   Muscle tone/strength: Symmetrical movements of extremities    Temp:  [98 °F (36.7 °C)] 98 °F (36.7 °C)  Heart Rate:  [65-75] 75  Resp:  [18] 18  BP: (104-148)/(79-88) 104/79    MENTAL STATUS EXAM:  Appearance: Casually dressed, fair grooming.   Behavior: Cooperative with interview, good eye contact  Psychomotor: No psychomotor agitation, No EPS reported or observed  Speech: Regular rate, rhythm and tone.  Mood: Depressed  Affect: Congruent  Thought Content: Hallucinations, suicidal ideation  Thought process: generally goal directed  Suicidality: See HPI   Homicidality: No HI  Perception: Auditory hallucinations  Insight: Limited  Judgment: limited       Lab Results (last 24 hours)       ** No results found for the last 24 hours. **               Imaging Results (Last 24 Hours)       ** No results found for the last 24 hours. **               ECG/EMG Results (most recent)       None             ALLERGIES: Patient has no known allergies.      Current Facility-Administered Medications:     acetaminophen (TYLENOL) tablet 650 mg, 650 mg, Oral, Q4H PRN, Nona York MD, 650 mg at 05/12/25 0920    aluminum-magnesium hydroxide-simethicone (MAALOX MAX) 400-400-40 MG/5ML suspension 15 mL, 15 mL, Oral, Q6H PRN, Nona York MD    benztropine (COGENTIN) tablet 2 mg, 2 mg, Oral, Daily PRN **OR** benztropine (COGENTIN) injection 1 mg, 1 mg, Intramuscular, Daily PRN, Nona York MD    celecoxib (CeleBREX) capsule 100 mg, 100 mg, Oral, BID PRN, Nona York MD, 100 mg at 05/10/25 1518    DULoxetine (CYMBALTA) DR capsule 60 mg, 60 mg, Oral, BID, Nona York MD, 60 mg at 05/12/25 0918    gabapentin (NEURONTIN) capsule 800 mg, 800 mg, Oral, Q12H, Nona York MD, 800 mg at 05/12/25 0918    hydrOXYzine (ATARAX) tablet 50 mg, 50 mg, Oral, Q6H PRN, Nona York MD, 50 mg at 05/11/25 2034     loperamide (IMODIUM) capsule 2 mg, 2 mg, Oral, Q2H PRN, Nona York MD    magnesium hydroxide (MILK OF MAGNESIA) suspension 10 mL, 10 mL, Oral, Daily PRN, Nona York MD    metoprolol succinate XL (TOPROL-XL) 24 hr tablet 25 mg, 25 mg, Oral, Daily, Nona York MD, 25 mg at 05/12/25 0918    OLANZapine zydis (zyPREXA) disintegrating tablet 10 mg, 10 mg, Oral, Q8H PRN, Nona York MD, 10 mg at 05/12/25 0918    pantoprazole (PROTONIX) EC tablet 40 mg, 40 mg, Oral, Q AM, Nona York MD, 40 mg at 05/12/25 0918    polyethylene glycol (MIRALAX) packet 17 g, 17 g, Oral, Daily PRN, Nona York MD    traZODone (DESYREL) tablet 100 mg, 100 mg, Oral, Nightly PRN, Marilee White MD, 100 mg at 05/11/25 2034          ASSESSMENT & PLAN:      Progress towards treatment plans and goals: Compliant with medications, attending groups, and individual therapy.  Rationale for continued level of care: Patient continues to need inpatient level of care for stabilization of psychiatric symptoms.  Patient would potentially decompensate further at a lower level of care.       Diagnosis:      Methamphetamine abuse    Major depressive disorder    Auditory hallucination      Plan:    -Continue hospitalization for safety and stabilization.  -Continue current precautions and safety checks.  -Encourage participation in therapeutic activities on the unit to increase coping skills for stressful life events. Including group and individual therapy.  -Continue to discuss case in treatment team meetings and continue discharge planning.  -Risk/benefit/alternatives to medications have been discussed with the patient.  We will continue to monitor for negative and positive effects to medications.   -Patient agrees with the following plan for today:    Medications:  Zyprexa PO 10mgs qhs      DULoxetine, 60 mg, Oral, BID  gabapentin, 800 mg, Oral, Q12H  metoprolol succinate XL, 25 mg, Oral, Daily  pantoprazole, 40 mg, Oral, Q AM          I  spent 17 minutes before, during and after on this encounter date of service, discussing case with treatment team, reviewing chart, interviewing and evaluating the patient, educating and counseling the patient, assessing patient's immediate risk, weighing risks associated with most appropriate level of care, formulating assessment and plan, documentation, writing orders, and communication with RN and staff.      Psychiatrist:  Nona York MD  05/12/25  17:47 EDT

## 2025-05-12 NOTE — SIGNIFICANT NOTE
05/12/25 1109   Group Therapy Session   Group Attendance attended group session   Time Session Began 1000   Time Session Ended 1100   Total Time (minutes) 60   Group Type psychotherapeutic;task skill   Group Topic Covered coping skills/lifestyle management   Literature/Videos Given topic handouts   Literature/Videos Given Comments Communication Drawing and ABC Coping   Group Session Detail Patients participated in a drawing exercise that creates a segue for many important communication topics. Patients were asked: What's the role of a listener in good communication? What happens when a speaker isn't careful? How might they be misunderstood? Patients also participated in utilizing critical thinking to brainstorm different coping strategies based on the letter in the alphabet. Patients participated in discussing healthy vs. unhealthy coping strategies. Patients participated in identifying scenarios when it's appropriate to utilize coping skills.   Patient Participation/Contribution cooperative with task;did not share thoughts verbally   Affect During Group affect consistent with mood   Degree of Insight moderate

## 2025-05-13 PROCEDURE — 99232 SBSQ HOSP IP/OBS MODERATE 35: CPT | Performed by: PSYCHIATRY & NEUROLOGY

## 2025-05-13 PROCEDURE — 97110 THERAPEUTIC EXERCISES: CPT

## 2025-05-13 RX ORDER — OLANZAPINE 5 MG/1
5 TABLET, FILM COATED ORAL DAILY
Status: DISCONTINUED | OUTPATIENT
Start: 2025-05-13 | End: 2025-05-15 | Stop reason: HOSPADM

## 2025-05-13 RX ADMIN — ACETAMINOPHEN 650 MG: 325 TABLET, FILM COATED ORAL at 20:25

## 2025-05-13 RX ADMIN — ACETAMINOPHEN 650 MG: 325 TABLET, FILM COATED ORAL at 08:47

## 2025-05-13 RX ADMIN — OLANZAPINE 5 MG: 5 TABLET, FILM COATED ORAL at 09:37

## 2025-05-13 RX ADMIN — OLANZAPINE 10 MG: 5 TABLET, ORALLY DISINTEGRATING ORAL at 08:47

## 2025-05-13 RX ADMIN — GABAPENTIN 800 MG: 400 CAPSULE ORAL at 08:44

## 2025-05-13 RX ADMIN — CELECOXIB 100 MG: 100 CAPSULE ORAL at 08:47

## 2025-05-13 RX ADMIN — TRAZODONE HYDROCHLORIDE 100 MG: 50 TABLET ORAL at 20:25

## 2025-05-13 RX ADMIN — METOPROLOL SUCCINATE 25 MG: 25 TABLET, EXTENDED RELEASE ORAL at 08:44

## 2025-05-13 RX ADMIN — GABAPENTIN 800 MG: 400 CAPSULE ORAL at 20:25

## 2025-05-13 RX ADMIN — OLANZAPINE 10 MG: 5 TABLET, ORALLY DISINTEGRATING ORAL at 20:25

## 2025-05-13 RX ADMIN — DULOXETINE 60 MG: 30 CAPSULE, DELAYED RELEASE ORAL at 08:44

## 2025-05-13 RX ADMIN — PANTOPRAZOLE SODIUM 40 MG: 40 TABLET, DELAYED RELEASE ORAL at 07:58

## 2025-05-13 RX ADMIN — HYDROXYZINE HYDROCHLORIDE 50 MG: 25 TABLET, FILM COATED ORAL at 20:25

## 2025-05-13 RX ADMIN — DULOXETINE 60 MG: 30 CAPSULE, DELAYED RELEASE ORAL at 20:24

## 2025-05-13 RX ADMIN — CELECOXIB 100 MG: 100 CAPSULE ORAL at 20:24

## 2025-05-13 NOTE — SIGNIFICANT NOTE
05/13/25 1105   Group Therapy Session   Group Attendance attended group session   Time Session Began 1000   Time Session Ended 1100   Total Time (minutes) 60   Group Type psychotherapeutic;psychoeducation   Group Topic Covered cognitive therapy techniques;cognitive activities;coping skills/lifestyle management;self care activities   Literature/Videos Given topic handouts   Literature/Videos Given Comments Unhealthy Vs. Healthy Coping Strategies   Group Session Detail Patients participated in completing a Healthy vs. Unhealthy Coping Strategies worksheet that provides psychoeducation on the importance of using healthy coping strategies and then guide them toward applying this knowledge in their own lives. The first page of this worksheet focuses on psychoeducation, using examples and real-life scenarios. The second page encourages the patient to explore a problem they are dealing with, and the various coping strategies they are using in response.   Patient Participation/Contribution closed eyes for most of session;did not discuss personal experience;did not share thoughts verbally;refused to participate   Affect During Group affect consistent with mood   Degree of Insight none

## 2025-05-13 NOTE — PLAN OF CARE
"Goal Outcome Evaluation:  Plan of Care Reviewed With: patient  Plan of Care Reviewed With: patient  Patient Agreement with Plan of Care: agrees     Progress: no change                              Pt reports SI with no plan on the unit. Pt states that \"the voices\" are telling him to \"do meth and to hang himself in the woods\" if he leaves here. Pt reports that he did not sleep well last night bc the voices kept telling him \" to stay awake.\" Pt denies HI. Pt reports his anxiety is 10/10 and depression is 10/10. Pt has attended groups today. Pt has remained calm and cooperative. Pts care plan is ongoing.   "

## 2025-05-13 NOTE — THERAPY TREATMENT NOTE
"DATA:    Therapist met with the patient individually. Therapist continues reviewing plan of care and aftercare plan.  The patient was agreeable.    ASSESSMENT:    Patient was seen for follow up of SI.    Today, patient was seen 1-1 in office. The patient's mood appears depressed, affect congruent, thought content normal. Patient reports sleep is Poor and appetite is Tolerating diet. On scale 1-10, patient rates depression 10 and anxiety 10. Patient does/does not report hallucinations: Auditory. Patient states that his sleep wasn't \"good\" due to the voices not letting him sleep. Patient states the voices \"have been laughing at me, cussing at me, telling me to kill myself when I get out.\" Patient reports that he can't recall a time where he didn't have to deal with the voices. Patient endorses current SI and the voices are a influencing factor. Patient reports that the cut on his wrist he did due to the voices telling him to and clarifies that he did it with his thumb nail.      CLINICAL MANEUVERING:    Therapist provided safe, secure environment for patient to share.  Provided reflective listening and psychoeducation.  Assisted patient in processing the above session. Assisted patient in identifying any questions or needs to be addressed today.  Therapist normalized and validated patient's feelings. Therapist utilized supportive psychotherapy.    Concern was voiced regarding substance use and educated patient on risks associated with use. Patient was advised to abstain from use and educated on community resources that can help with sobriety and recovery.        Plan:     Patient to remain hospitalized this date.      Treatment team will focus efforts on stabilizing patient's acute symptoms while providing education on healthy coping and crisis management to reduce hospitalizations.   Patient requires daily psychiatrist evaluation and 24/7 nursing supervision to promote patient  safety.     Therapist will offer 1-4 " individual sessions, family education, and appropriate referral.     Therapist recommends continued assessment.

## 2025-05-13 NOTE — PLAN OF CARE
"Goal Outcome Evaluation:  Plan of Care Reviewed With: patient  Plan of Care Reviewed With: patient  Patient Agreement with Plan of Care: agrees     Progress: no change  Outcome Evaluation: no acute events during shift at this time. VSS. denies HI. reports SI, no plan on unit. AH of voices telling him to \"kill self\" and that he is \"scum of the earth\". anxiety and depression 10/10. pt did not show signs of pain until asked, then began grimacing and holding back. reports pain 10/10. PRN tylenol, celebrex, and trazodone given. no other changes in pt condition at this time. continue plan of care.                             "

## 2025-05-13 NOTE — SIGNIFICANT NOTE
05/13/25 1017   Group Therapy Session   Group Attendance attended group session   Time Session Began 0900   Time Session Ended 1000   Total Time (minutes) 60   Group Type recreation   Group Topic Covered cognitive activities   Group Session Detail Patient participated in word game to promote communication skills.   Patient Participation/Contribution able to recall/repeat info presented;cooperative with task;discussed personal experience with topic;expressed understanding of topic;listened actively;offered helpful suggestions to peers;organized   Affect During Group affect consistent with mood;appropriate to situation   Degree of Insight moderate

## 2025-05-13 NOTE — PLAN OF CARE
Goal Outcome Evaluation:  Plan of Care Reviewed With: patient        Progress: improving  Outcome Evaluation: Patient participated in PT treatment including stretches, joint mobilization, and exercises.  Muscle energy is performed to right SI joint.  This was followed by stretches for his lumbar spine and piriformis.  He is able to perform exercises including bridges and glut sets.  Plan to continue PT per POC.
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
## 2025-05-13 NOTE — SIGNIFICANT NOTE
05/13/25 1334   Group Therapy Session   Group Attendance attended group session   Time Session Began 1130   Time Session Ended 1200   Total Time (minutes) 30   Group Type recreation   Group Topic Covered mindfulness   Group Session Detail Patient participated in discussion about how a consistent mindfulness practice can result in increased quality of life.   Patient Participation/Contribution closed eyes for most of session;refused to participate   Affect During Group aloof   Degree of Insight low

## 2025-05-14 PROCEDURE — 99232 SBSQ HOSP IP/OBS MODERATE 35: CPT | Performed by: PSYCHIATRY & NEUROLOGY

## 2025-05-14 RX ORDER — DIPHENHYDRAMINE HYDROCHLORIDE, ZINC ACETATE 2; .1 G/100G; G/100G
1 CREAM TOPICAL 3 TIMES DAILY PRN
Status: DISCONTINUED | OUTPATIENT
Start: 2025-05-14 | End: 2025-05-15 | Stop reason: HOSPADM

## 2025-05-14 RX ADMIN — OLANZAPINE 5 MG: 5 TABLET, FILM COATED ORAL at 08:55

## 2025-05-14 RX ADMIN — PANTOPRAZOLE SODIUM 40 MG: 40 TABLET, DELAYED RELEASE ORAL at 07:02

## 2025-05-14 RX ADMIN — DULOXETINE 60 MG: 30 CAPSULE, DELAYED RELEASE ORAL at 08:54

## 2025-05-14 RX ADMIN — DIPHENHYDRAMINE HYDROCHLORIDE, ZINC ACETATE 1 APPLICATION: 2; .1 CREAM TOPICAL at 23:30

## 2025-05-14 RX ADMIN — ACETAMINOPHEN 650 MG: 325 TABLET, FILM COATED ORAL at 20:28

## 2025-05-14 RX ADMIN — CELECOXIB 100 MG: 100 CAPSULE ORAL at 20:31

## 2025-05-14 RX ADMIN — GABAPENTIN 800 MG: 400 CAPSULE ORAL at 20:30

## 2025-05-14 RX ADMIN — TRAZODONE HYDROCHLORIDE 100 MG: 50 TABLET ORAL at 20:30

## 2025-05-14 RX ADMIN — OLANZAPINE 10 MG: 5 TABLET, FILM COATED ORAL at 20:29

## 2025-05-14 RX ADMIN — GABAPENTIN 800 MG: 400 CAPSULE ORAL at 08:55

## 2025-05-14 RX ADMIN — DULOXETINE 60 MG: 30 CAPSULE, DELAYED RELEASE ORAL at 20:30

## 2025-05-14 RX ADMIN — METOPROLOL SUCCINATE 25 MG: 25 TABLET, EXTENDED RELEASE ORAL at 08:54

## 2025-05-14 RX ADMIN — HYDROXYZINE HYDROCHLORIDE 50 MG: 25 TABLET, FILM COATED ORAL at 20:30

## 2025-05-14 NOTE — PLAN OF CARE
Goal Outcome Evaluation:  Plan of Care Reviewed With: patient  Plan of Care Reviewed With: patient  Patient Agreement with Plan of Care: agrees     Progress: no change  Outcome Evaluation: Pt endorses SI with no plan while on unit but says he will hang himself once discharged. He denies HI. He reports hearing voices telling him to harm himself. He rates anxiety 10/10 and depression 10/10. He complains of 10/10 back pain, pain management plan reviewed with pt but he did not request PRN medications. Pt avoids social contact and does not participate in group activities or discussions.

## 2025-05-14 NOTE — PLAN OF CARE
"Goal Outcome Evaluation:  Plan of Care Reviewed With: patient  Plan of Care Reviewed With: patient  Patient Agreement with Plan of Care: agrees  Consent Given to Review Plan with: Pt withdrawn from peers. Pt reports anxiety of 10/10 and depression of 10/10. Pt continues to endorse SI and reports that he will hurt himself if discharged. Pt reports that he will not harm himself while on unit. Pt denies HI. Pt reports AH of voices that tell him \"to hurt myself\". PRN tylenol, celebrex, atarax and trazodone given overnight. Pt has slept well. Will continue plan of care.  Progress: no change                                "

## 2025-05-14 NOTE — SIGNIFICANT NOTE
05/14/25 1505   Group Therapy Session   Group Attendance attended group session   Time Session Began 1130   Time Session Ended 1200   Total Time (minutes) 30   Group Type recreation   Group Topic Covered emotions/expression   Group Session Detail Patient participated in [employer] game to promote identification of specific emotions throughout their life.   Patient Participation/Contribution refused to participate;closed eyes for most of session   Affect During Group aloof   Degree of Insight low

## 2025-05-14 NOTE — THERAPY TREATMENT NOTE
DATA:    Therapist met with the patient individually. Therapist continues reviewing plan of care and aftercare plan.  The patient was agreeable.    ASSESSMENT:    Patient was seen for follow up of  SI/AH.    Today, patient was seen 1-1 in office. The patient's mood appears appropriate to circumstances, affect congruent, thought content normal. Patient reports sleep is Fair and appetite is Fair. On scale 1-10, patient rates depression 10 and anxiety 10. Patient does/does not report hallucinations: Auditory. Patient continues to endorse suicidal thoughts, due to the AH making him feel worse. Patient continues to voice that he has a plan of hanging himself.      CLINICAL MANEUVERING:    Therapist provided safe, secure environment for patient to share.  Provided reflective listening and psychoeducation.  Assisted patient in processing the above session. Assisted patient in identifying any questions or needs to be addressed today.  Therapist normalized and validated patient's feelings. Therapist utilized supportive psychotherapy.      Concern was voiced regarding substance use and educated patient on risks associated with use. Patient was advised to abstain from use and educated on community resources that can help with sobriety and recovery.        Plan:     Patient to remain hospitalized this date.      Treatment team will focus efforts on stabilizing patient's acute symptoms while providing education on healthy coping and crisis management to reduce hospitalizations.   Patient requires daily psychiatrist evaluation and 24/7 nursing supervision to promote patient  safety.     Therapist will offer 1-4 individual sessions, family education, and appropriate referral.     Therapist recommends continued assessment.

## 2025-05-14 NOTE — DISCHARGE INSTR - APPOINTMENTS
If interested in case management, please call the contact below:    [facility]  [phone] ext: [phone]

## 2025-05-14 NOTE — PROGRESS NOTES
INPATIENT PSYCHIATRIC PROGRESS NOTE    Name:  Aldo Humphrey  :  1965  MRN:  [id number]  Visit Number:  [id number]  Hospital Day: 6 days    This provider is located at home address on file with [facility]. This visit is being done on behalf of [facility] using a secure platform for a video visit in a secure and private environment. The Patient is located at [facility]-on a locked Behavioral Health unit. The patient's condition being diagnosed/treated is appropriate for telemedicine. The provider identified self as well as credentials. The patient, and/or patient's guardian, consent to being seen remotely, and when consent is given they understand that the consent allows for patient identifiable information to be sent to a third party as needed. They may refuse to be seen remotely at any time. The electronic data is encrypted and password protected, and the patient and/or guardian has been advised of the potential risks to privacy not withstanding such measures.    SUBJECTIVE    CC/Focus of Exam: Hallucinations, suicidal thoughts, depression    INTERVAL HISTORY:   Patient seen chart reviewed and case discussed in treatment team.     On interview today patient reports continued thoughts of depression as well as auditory hallucinations.  He states that he continues to have suicidal thoughts with thoughts to hang himself  and feels like the review of the hospital that he would act on these thoughts.  He is agreeable to increasing the Zyprexa to help with his hallucinations.  He denies craving substances at this time.    Review of Systems    No dizziness, no seizures, no headaches, no nausea/vomiting.    OBJECTIVE      PHYSICAL EXAM:  Vital signs: Reviewed and listed below  Gait and station: Patient using a walker and is working with physical therapy  Muscle tone/strength: Symmetrical movements of extremities    Temp:  [97.5 °F (36.4 °C)-97.9 °F (36.6 °C)] 97.9 °F (36.6  °C)  Heart Rate:  [62] 62  Resp:  [16] 16  BP: (117-143)/(81-89) 143/81    MENTAL STATUS EXAM:  Appearance: Casually dressed, fair grooming.   Behavior: Cooperative with interview, good eye contact  Psychomotor: No psychomotor agitation, No EPS reported or observed  Speech: Regular rate, rhythm and tone.  Mood: Depressed  Affect: Congruent  Thought Content: Auditory hallucinations, suicidal ideation  Thought process: generally goal directed  Suicidality: See HPI   Homicidality: No HI  Perception: Auditory hallucinations  Insight: Limited  Judgment: limited       Lab Results (last 24 hours)       ** No results found for the last 24 hours. **               Imaging Results (Last 24 Hours)       ** No results found for the last 24 hours. **               ECG/EMG Results (most recent)       None             ALLERGIES: Patient has no known allergies.      Current Facility-Administered Medications:     acetaminophen (TYLENOL) tablet 650 mg, 650 mg, Oral, Q4H PRN, Nona York MD, 650 mg at 05/13/25 2025    aluminum-magnesium hydroxide-simethicone (MAALOX MAX) 400-400-40 MG/5ML suspension 15 mL, 15 mL, Oral, Q6H PRN, Nona York MD    benztropine (COGENTIN) tablet 2 mg, 2 mg, Oral, Daily PRN **OR** benztropine (COGENTIN) injection 1 mg, 1 mg, Intramuscular, Daily PRN, Nona York MD    celecoxib (CeleBREX) capsule 100 mg, 100 mg, Oral, BID PRN, Nona York MD, 100 mg at 05/13/25 2024    DULoxetine (CYMBALTA) DR capsule 60 mg, 60 mg, Oral, BID, Nona York MD, 60 mg at 05/13/25 2024    gabapentin (NEURONTIN) capsule 800 mg, 800 mg, Oral, Q12H, Nona York MD, 800 mg at 05/13/25 2025    hydrOXYzine (ATARAX) tablet 50 mg, 50 mg, Oral, Q6H PRN, Nona York MD, 50 mg at 05/13/25 2025    loperamide (IMODIUM) capsule 2 mg, 2 mg, Oral, Q2H PRN, Nona York MD    magnesium hydroxide (MILK OF MAGNESIA) suspension 10 mL, 10 mL, Oral, Daily PRN, Nona York MD    metoprolol succinate XL (TOPROL-XL) 24  hr tablet 25 mg, 25 mg, Oral, Daily, Nona York MD, 25 mg at 05/13/25 0844    OLANZapine (zyPREXA) tablet 10 mg, 10 mg, Oral, Nightly, Nona York MD, 10 mg at 05/12/25 2015    OLANZapine (zyPREXA) tablet 5 mg, 5 mg, Oral, Daily, Nona York MD, 5 mg at 05/13/25 0937    OLANZapine zydis (zyPREXA) disintegrating tablet 10 mg, 10 mg, Oral, Q8H PRN, Nona York MD, 10 mg at 05/13/25 2025    pantoprazole (PROTONIX) EC tablet 40 mg, 40 mg, Oral, Q AM, Nona York MD, 40 mg at 05/13/25 0758    polyethylene glycol (MIRALAX) packet 17 g, 17 g, Oral, Daily PRN, Nona York MD    traZODone (DESYREL) tablet 100 mg, 100 mg, Oral, Nightly PRN, Marilee White MD, 100 mg at 05/13/25 2025          ASSESSMENT & PLAN:      Progress towards treatment plans and goals: Compliant with medications, attending groups, and individual therapy.  Rationale for continued level of care: Patient continues to need inpatient level of care for stabilization of psychiatric symptoms.  Patient would potentially decompensate further at a lower level of care.       Diagnosis:      Methamphetamine abuse    Major depressive disorder    Auditory hallucination      Plan:    -Continue hospitalization for safety and stabilization.  -Continue current precautions and safety checks.  -Encourage participation in therapeutic activities on the unit to increase coping skills for stressful life events. Including group and individual therapy.  -Continue to discuss case in treatment team meetings and continue discharge planning.  -Risk/benefit/alternatives to medications have been discussed with the patient.  We will continue to monitor for negative and positive effects to medications.   -Patient agrees with the following plan for today:    Medications:  Increase Zyprexa by 5mgs in the AM.  Monitor for Side effects such as sedation as well as for changes in hallucinations and mood.      DULoxetine, 60 mg, Oral, BID  gabapentin, 800 mg, Oral,  Q12H  metoprolol succinate XL, 25 mg, Oral, Daily  OLANZapine, 10 mg, Oral, Nightly  OLANZapine, 5 mg, Oral, Daily  pantoprazole, 40 mg, Oral, Q AM          I spent 18  minutes before, during and after on this encounter date of service, discussing case with treatment team, reviewing chart, interviewing and evaluating the patient, educating and counseling the patient, assessing patient's immediate risk, weighing risks associated with most appropriate level of care, formulating assessment and plan, documentation, writing orders, and communication with RN and staff.      Psychiatrist:  Nona York MD  05/13/25  22:21 EDT

## 2025-05-14 NOTE — SIGNIFICANT NOTE
05/14/25 1109   Group Therapy Session   Group Attendance attended group session   Time Session Began 1000   Time Session Ended 1100   Total Time (minutes) 60   Group Type psychoeducation   Group Topic Covered other (see comments)  (Boundaries)   Group Session Detail Patient participated watching visual aid and discussion about the importance of boundaries. Patient was assisted with determing steps to take to create better personal boundaries for overall mental health.   Patient Participation/Contribution closed eyes for most of session;listened actively   Affect During Group affect consistent with mood;appropriate to situation   Degree of Insight moderate

## 2025-05-14 NOTE — SIGNIFICANT NOTE
05/14/25 1100   Group Therapy Session   Group Attendance attended group session   Time Session Began 0900   Time Session Ended 1000   Total Time (minutes) 60   Group Type recreation   Group Topic Covered other (see comments)  (Mood elevation)   Group Session Detail Patient participated in activity to promote socialization with peers and improve overall mood.   Patient Participation/Contribution able to recall/repeat info presented;cooperative with task;discussed personal experience with topic;expressed understanding of topic;listened actively;offered helpful suggestions to peers;organized   Affect During Group affect consistent with mood;appropriate to situation   Degree of Insight moderate

## 2025-05-15 VITALS
DIASTOLIC BLOOD PRESSURE: 96 MMHG | TEMPERATURE: 97.7 F | WEIGHT: 175.6 LBS | HEART RATE: 67 BPM | SYSTOLIC BLOOD PRESSURE: 140 MMHG | OXYGEN SATURATION: 97 % | RESPIRATION RATE: 16 BRPM | BODY MASS INDEX: 29.26 KG/M2 | HEIGHT: 65 IN

## 2025-05-15 PROCEDURE — 99238 HOSP IP/OBS DSCHRG MGMT 30/<: CPT | Performed by: PSYCHIATRY & NEUROLOGY

## 2025-05-15 RX ORDER — OLANZAPINE 5 MG/1
5 TABLET, FILM COATED ORAL DAILY
Start: 2025-05-16

## 2025-05-15 RX ORDER — DULOXETIN HYDROCHLORIDE 60 MG/1
60 CAPSULE, DELAYED RELEASE ORAL 2 TIMES DAILY
Start: 2025-05-15

## 2025-05-15 RX ORDER — OLANZAPINE 10 MG/1
10 TABLET, FILM COATED ORAL NIGHTLY
Start: 2025-05-15

## 2025-05-15 RX ORDER — HYDROXYZINE HYDROCHLORIDE 50 MG/1
50 TABLET, FILM COATED ORAL EVERY 6 HOURS PRN
Start: 2025-05-15

## 2025-05-15 RX ORDER — METOPROLOL SUCCINATE 25 MG/1
25 TABLET, EXTENDED RELEASE ORAL DAILY
Start: 2025-05-16

## 2025-05-15 RX ORDER — TRAZODONE HYDROCHLORIDE 100 MG/1
100 TABLET ORAL NIGHTLY PRN
Start: 2025-05-15

## 2025-05-15 RX ADMIN — GABAPENTIN 800 MG: 400 CAPSULE ORAL at 08:06

## 2025-05-15 RX ADMIN — OLANZAPINE 5 MG: 5 TABLET, FILM COATED ORAL at 08:05

## 2025-05-15 RX ADMIN — DULOXETINE 60 MG: 30 CAPSULE, DELAYED RELEASE ORAL at 08:06

## 2025-05-15 RX ADMIN — METOPROLOL SUCCINATE 25 MG: 25 TABLET, EXTENDED RELEASE ORAL at 08:06

## 2025-05-15 RX ADMIN — PANTOPRAZOLE SODIUM 40 MG: 40 TABLET, DELAYED RELEASE ORAL at 08:07

## 2025-05-15 NOTE — CASE MANAGEMENT/SOCIAL WORK
Pt to be petitioned to [facility] for continued treatment. No additional discharge planning was conducted.     Electronically Signed By:  Jose Green MSW    Date/Time: 5/15/25 10:14     (4) rarely moist

## 2025-05-15 NOTE — THERAPY DISCHARGE NOTE
Patient continues to endorse SI with plan to go hang himself in the woods. Treatment team decided patient be transferred for higher level level of care due to not benefiting from current treatment. Patient is being petitioned to [facility] due to being unable to safety plan at this time.

## 2025-05-15 NOTE — PROGRESS NOTES
INPATIENT PSYCHIATRIC PROGRESS NOTE    Name:  Aldo Humphrey  :  1965  MRN:  [id number]  Visit Number:  [id number]  Hospital Day: 7 days    This provider is located at home address on file with [facility]. This visit is being done on behalf of [facility] using a secure platform for a video visit in a secure and private environment. The Patient is located at [facility]-on a locked Behavioral Health unit. The patient's condition being diagnosed/treated is appropriate for telemedicine. The provider identified self as well as credentials. The patient, and/or patient's guardian, consent to being seen remotely, and when consent is given they understand that the consent allows for patient identifiable information to be sent to a third party as needed. They may refuse to be seen remotely at any time. The electronic data is encrypted and password protected, and the patient and/or guardian has been advised of the potential risks to privacy not withstanding such measures.    SUBJECTIVE    CC/Focus of Exam: Hallucinations, suicidal thoughts, depression    INTERVAL HISTORY:   Patient seen chart reviewed and case discussed in treatment team.   Patient reports continued hallucinations and plan to hang himself once he leaves the hospital. He reports limited improvement in the way he feels.   Denies side effects to his medications.      Review of Systems    No dizziness, no seizures, no headaches, no nausea/vomiting.    OBJECTIVE      PHYSICAL EXAM:  Vital signs: Reviewed and listed below  Gait and station: Patient using a walker and is working with physical therapy  Muscle tone/strength: Symmetrical movements of extremities    Temp:  [97.8 °F (36.6 °C)-98.5 °F (36.9 °C)] 98.5 °F (36.9 °C)  Heart Rate:  [68-78] 78  Resp:  [16-18] 18  BP: (112-127)/(81-84) 127/84    MENTAL STATUS EXAM:  Appearance: Casually dressed, fair grooming.   Behavior: Cooperative with interview, good eye  contact  Psychomotor: No psychomotor agitation, No EPS reported or observed  Speech: Regular rate, rhythm and tone.  Mood: Depressed  Affect: Congruent  Thought Content: Auditory hallucinations, suicidal ideation  Thought process: generally goal directed  Suicidality: See HPI   Homicidality: No HI  Perception: Auditory hallucinations  Insight: Limited  Judgment: limited       Lab Results (last 24 hours)       ** No results found for the last 24 hours. **               Imaging Results (Last 24 Hours)       ** No results found for the last 24 hours. **               ECG/EMG Results (most recent)       None             ALLERGIES: Patient has no known allergies.      Current Facility-Administered Medications:     acetaminophen (TYLENOL) tablet 650 mg, 650 mg, Oral, Q4H PRN, Nona York MD, 650 mg at 05/14/25 2028    aluminum-magnesium hydroxide-simethicone (MAALOX MAX) 400-400-40 MG/5ML suspension 15 mL, 15 mL, Oral, Q6H PRN, Nona York MD    benztropine (COGENTIN) tablet 2 mg, 2 mg, Oral, Daily PRN **OR** benztropine (COGENTIN) injection 1 mg, 1 mg, Intramuscular, Daily PRN, Nona York MD    celecoxib (CeleBREX) capsule 100 mg, 100 mg, Oral, BID PRN, Nona York MD, 100 mg at 05/14/25 2031    diphenhydrAMINE-zinc acetate 2-0.1 % cream 1 Application, 1 Application, Topical, TID PRN, Andres Lyn MD    DULoxetine (CYMBALTA) DR capsule 60 mg, 60 mg, Oral, BID, Nona York MD, 60 mg at 05/14/25 2030    gabapentin (NEURONTIN) capsule 800 mg, 800 mg, Oral, Q12H, Nona York MD, 800 mg at 05/14/25 2030    hydrOXYzine (ATARAX) tablet 50 mg, 50 mg, Oral, Q6H PRN, Nona York MD, 50 mg at 05/14/25 2030    loperamide (IMODIUM) capsule 2 mg, 2 mg, Oral, Q2H PRN, Nona York MD    magnesium hydroxide (MILK OF MAGNESIA) suspension 10 mL, 10 mL, Oral, Daily PRN, Nona York MD    metoprolol succinate XL (TOPROL-XL) 24 hr tablet 25 mg, 25 mg, Oral, Daily, Nona York MD, 25 mg at  05/14/25 0854    OLANZapine (zyPREXA) tablet 10 mg, 10 mg, Oral, Nightly, Nona York MD, 10 mg at 05/14/25 2029    OLANZapine (zyPREXA) tablet 5 mg, 5 mg, Oral, Daily, Nona York MD, 5 mg at 05/14/25 0855    OLANZapine zydis (zyPREXA) disintegrating tablet 10 mg, 10 mg, Oral, Q8H PRN, Nona York MD, 10 mg at 05/13/25 2025    pantoprazole (PROTONIX) EC tablet 40 mg, 40 mg, Oral, Q AM, Nona York MD, 40 mg at 05/14/25 0702    polyethylene glycol (MIRALAX) packet 17 g, 17 g, Oral, Daily PRN, Nona York MD    traZODone (DESYREL) tablet 100 mg, 100 mg, Oral, Nightly PRN, Marilee White MD, 100 mg at 05/14/25 2030          ASSESSMENT & PLAN:      Progress towards treatment plans and goals: Compliant with medications, attending groups, and individual therapy.  Rationale for continued level of care: Patient continues to need inpatient level of care for stabilization of psychiatric symptoms.  Patient would potentially decompensate further at a lower level of care.       Diagnosis:      Methamphetamine abuse    Major depressive disorder    Auditory hallucination      Plan:    -Continue hospitalization for safety and stabilization.  -Continue current precautions and safety checks.  -Encourage participation in therapeutic activities on the unit to increase coping skills for stressful life events. Including group and individual therapy.  -Continue to discuss case in treatment team meetings and continue discharge planning.  -Risk/benefit/alternatives to medications have been discussed with the patient.  We will continue to monitor for negative and positive effects to medications.   -Patient agrees with the following plan for today:    Medications:  Continue current meds.   Monitor for side effects/improvements       DULoxetine, 60 mg, Oral, BID  gabapentin, 800 mg, Oral, Q12H  metoprolol succinate XL, 25 mg, Oral, Daily  OLANZapine, 10 mg, Oral, Nightly  OLANZapine, 5 mg, Oral, Daily  pantoprazole, 40 mg,  Oral, Q AM          I spent 18  minutes before, during and after on this encounter date of service, discussing case with treatment team, reviewing chart, interviewing and evaluating the patient, educating and counseling the patient, assessing patient's immediate risk, weighing risks associated with most appropriate level of care, formulating assessment and plan, documentation, writing orders, and communication with RN and staff.      Psychiatrist:  Nona York MD  05/14/25  23:59 EDT

## 2025-05-15 NOTE — PLAN OF CARE
"Goal Outcome Evaluation:  Plan of Care Reviewed With: patient  Patient Agreement with Plan of Care: agrees        Outcome Evaluation: Pt calm and cooperative with assssment but withdrawn from peers. Rates anxiety and depression 10/10. Continue to reports SI erick no plan on unit but states \"When I leave here I will go to the woods and hang myself.\". Reports aud/command mustafa telling him to hang self. Reports 10/10 back pain. C/O rash on bilateral upper thighs, Dr. Lyn ordered Benadryl Cream, on follow up examination rash was almost gone.  Received PRN tylenol, celebrex, atarax, and trazodone. No further complaints voiced. Continue current plan of care.                             "

## 2025-05-15 NOTE — PLAN OF CARE
"Goal Outcome Evaluation:  Plan of Care Reviewed With: patient  Plan of Care Reviewed With: patient  Patient Agreement with Plan of Care: agrees     Progress: no change  Outcome Evaluation: Pt continues to endorse SI with no plan on unit but has a plan to hang himself \"in the woods\" when he is discharged. He denies HI. He continues to report hearing voices telling him to harm himself. He rates anxiety 10/10 and depression 10/10. He complains of back pain 10/10, pain mangement plan was reviewed with pt.                             "

## 2025-05-15 NOTE — DISCHARGE SUMMARY
Inpatient Psychiatry Discharge Summary  Date of Discharge:  5/15/2025    Discharge Diagnosis:Active Problems:    Suicidal ideations    Methamphetamine abuse    Major depressive disorder    Auditory hallucination        Hospital Course:  Patient was admitted for safety and stabilization.   Routine labs were checked.  Patient was assigned a master's level therapist and provided with an opportunity to participate in group and individual therapy and counseling on the unit.  Patient showed little to no improvement in his reported symptoms.  He continued to endorse suicidal ideations and stated he was having hallucinations telling him to hang himself when he leave the hospital.   He tolerated his medications well but reported little to no improvement in his symptoms.   We discussed his options and he is wanting to be referred to [facility] for continued care as he does not feel he can keep himself safe if he were to leave the hospital setting.  Patient not interested in discharge planning in alternative ways that may be helpful to him in general such a substance use rehab.  He was seen by PT who worked with him and cleared him from use of the walker.     Zyprexa was maximized to 10mgs qhs and 5mgs qam.    Cymbalta was continued at 120mgs daily  Hydroxyzine and Trazodone       Consults:   Consults       No orders found from 4/8/2025 to 5/8/2025.            Labs:  Lab Results (all)       None            Imaging:  Imaging Results (All)       None              Condition on Discharge:  Stable    Vital Signs  Temp:  [97.7 °F (36.5 °C)-98.5 °F (36.9 °C)] 97.7 °F (36.5 °C)  Heart Rate:  [67-78] 67  Resp:  [16-18] 16  BP: (127-140)/(84-96) 140/96    Discharge Disposition  Psychiatric Hospital or Unit (DC - External or [facility])Petition to [facility] for continued stabilization    Discharge Medications     Discharge Medications        New Medications        Instructions Start Date   hydrOXYzine 50 MG tablet  Commonly known as: ATARAX   50 mg,  Oral, Every 6 Hours PRN      OLANZapine 10 MG tablet  Commonly known as: zyPREXA   10 mg, Oral, Nightly      OLANZapine 5 MG tablet  Commonly known as: zyPREXA   5 mg, Oral, Daily   Start Date: May 16, 2025     traZODone 100 MG tablet  Commonly known as: DESYREL   100 mg, Oral, Nightly PRN             Changes to Medications        Instructions Start Date   DULoxetine 60 MG capsule  Commonly known as: CYMBALTA  What changed: Another medication with the same name was removed. Continue taking this medication, and follow the directions you see here.   60 mg, Oral, 2 Times Daily             Continue These Medications        Instructions Start Date   acetaminophen 650 MG 8 hr tablet  Commonly known as: TYLENOL   650 mg, Every 8 Hours PRN      celecoxib 100 MG capsule  Commonly known as: CeleBREX   TAKE 1 CAPSULE BY MOUTH IN THE MORNING FOR BACK PAIN, UP TO EVERY 12 HOURS IF NEEDED      gabapentin 800 MG tablet  Commonly known as: NEURONTIN   1 tablet, 3 times daily      metoprolol succinate XL 25 MG 24 hr tablet  Commonly known as: TOPROL-XL   25 mg, Oral, Daily   Start Date: May 16, 2025     omeprazole 40 MG capsule  Commonly known as: priLOSEC   TAKE 1 CAPSULE BY MOUTH ONCE DAILY 30 MINUTES BEFORE FIRST MEAL             Stop These Medications      atorvastatin 40 MG tablet  Commonly known as: LIPITOR     cyclobenzaprine 10 MG tablet  Commonly known as: FLEXERIL     lidocaine 5 %  Commonly known as: LIDODERM     ondansetron ODT 4 MG disintegrating tablet  Commonly known as: ZOFRAN-ODT              Discharge Diet: Regular    Activity at Discharge: As tolerated    Follow-up Appointments:  Per [facility] at time of discharge        Time: I spent  <30 minutes on this discharge activity which included: face-to-face encounter with the patient, reviewing the data in the system, coordination of the care with the nursing staff as well as consultants, documentation, and entering orders.    Nona York MD

## 2025-05-15 NOTE — NURSING NOTE
Pt c/o rash on upper thigh area bilaterally this nurse examined area and observed red non raised rash. Pt reports rash does not itch and he only noticed it today. This nurse contacted on call Dr. Lyn orders received for benadryl cream and to obtain photos of rash for pts chart to monitor for changes.

## 2025-05-28 ENCOUNTER — HOSPITAL ENCOUNTER (EMERGENCY)
Facility: HOSPITAL | Age: 60
Discharge: PSYCHIATRIC OR SUBSTANCE USE FACILITY | End: 2025-05-28
Attending: EMERGENCY MEDICINE | Admitting: EMERGENCY MEDICINE
Payer: MEDICAID

## 2025-05-28 VITALS
TEMPERATURE: 98 F | SYSTOLIC BLOOD PRESSURE: 138 MMHG | WEIGHT: 175.71 LBS | HEART RATE: 72 BPM | OXYGEN SATURATION: 99 % | DIASTOLIC BLOOD PRESSURE: 88 MMHG | RESPIRATION RATE: 16 BRPM | HEIGHT: 65 IN | BODY MASS INDEX: 29.27 KG/M2

## 2025-05-28 DIAGNOSIS — R45.851 SUICIDAL IDEATION: Primary | ICD-10-CM

## 2025-05-28 LAB
ALBUMIN SERPL-MCNC: 3.9 G/DL (ref 3.5–5.2)
ALBUMIN/GLOB SERPL: 1.2 G/DL
ALP SERPL-CCNC: 131 U/L (ref 39–117)
ALT SERPL W P-5'-P-CCNC: 15 U/L (ref 1–41)
AMPHET+METHAMPHET UR QL: NEGATIVE
AMPHETAMINES UR QL: POSITIVE
ANION GAP SERPL CALCULATED.3IONS-SCNC: 14.5 MMOL/L (ref 5–15)
APAP SERPL-MCNC: <5 MCG/ML (ref 0–30)
AST SERPL-CCNC: 21 U/L (ref 1–40)
BARBITURATES UR QL SCN: NEGATIVE
BASOPHILS # BLD AUTO: 0.05 10*3/MM3 (ref 0–0.2)
BASOPHILS NFR BLD AUTO: 0.7 % (ref 0–1.5)
BENZODIAZ UR QL SCN: NEGATIVE
BILIRUB SERPL-MCNC: 0.3 MG/DL (ref 0–1.2)
BILIRUB UR QL STRIP: NEGATIVE
BUN SERPL-MCNC: 6 MG/DL (ref 6–20)
BUN/CREAT SERPL: 7.1 (ref 7–25)
BUPRENORPHINE SERPL-MCNC: NEGATIVE NG/ML
CALCIUM SPEC-SCNC: 8.7 MG/DL (ref 8.6–10.5)
CANNABINOIDS SERPL QL: POSITIVE
CHLORIDE SERPL-SCNC: 104 MMOL/L (ref 98–107)
CLARITY UR: CLEAR
CO2 SERPL-SCNC: 23.5 MMOL/L (ref 22–29)
COCAINE UR QL: NEGATIVE
COLOR UR: YELLOW
CREAT SERPL-MCNC: 0.85 MG/DL (ref 0.76–1.27)
DEPRECATED RDW RBC AUTO: 46.1 FL (ref 37–54)
EGFRCR SERPLBLD CKD-EPI 2021: 100.7 ML/MIN/1.73
EOSINOPHIL # BLD AUTO: 0.18 10*3/MM3 (ref 0–0.4)
EOSINOPHIL NFR BLD AUTO: 2.5 % (ref 0.3–6.2)
ERYTHROCYTE [DISTWIDTH] IN BLOOD BY AUTOMATED COUNT: 15.4 % (ref 12.3–15.4)
ETHANOL BLD-MCNC: <10 MG/DL (ref 0–10)
ETHANOL UR QL: <0.01 %
FENTANYL UR-MCNC: NEGATIVE NG/ML
GLOBULIN UR ELPH-MCNC: 3.3 GM/DL
GLUCOSE SERPL-MCNC: 103 MG/DL (ref 65–99)
GLUCOSE UR STRIP-MCNC: NEGATIVE MG/DL
HCT VFR BLD AUTO: 37.1 % (ref 37.5–51)
HGB BLD-MCNC: 12.3 G/DL (ref 13–17.7)
HGB UR QL STRIP.AUTO: NEGATIVE
HOLD SPECIMEN: NORMAL
HOLD SPECIMEN: NORMAL
IMM GRANULOCYTES # BLD AUTO: 0.02 10*3/MM3 (ref 0–0.05)
IMM GRANULOCYTES NFR BLD AUTO: 0.3 % (ref 0–0.5)
KETONES UR QL STRIP: NEGATIVE
LEUKOCYTE ESTERASE UR QL STRIP.AUTO: NEGATIVE
LYMPHOCYTES # BLD AUTO: 3.04 10*3/MM3 (ref 0.7–3.1)
LYMPHOCYTES NFR BLD AUTO: 42.2 % (ref 19.6–45.3)
MCH RBC QN AUTO: 27.1 PG (ref 26.6–33)
MCHC RBC AUTO-ENTMCNC: 33.2 G/DL (ref 31.5–35.7)
MCV RBC AUTO: 81.7 FL (ref 79–97)
METHADONE UR QL SCN: NEGATIVE
MONOCYTES # BLD AUTO: 0.73 10*3/MM3 (ref 0.1–0.9)
MONOCYTES NFR BLD AUTO: 10.1 % (ref 5–12)
NEUTROPHILS NFR BLD AUTO: 3.19 10*3/MM3 (ref 1.7–7)
NEUTROPHILS NFR BLD AUTO: 44.2 % (ref 42.7–76)
NITRITE UR QL STRIP: NEGATIVE
NRBC BLD AUTO-RTO: 0 /100 WBC (ref 0–0.2)
OPIATES UR QL: NEGATIVE
OXYCODONE UR QL SCN: NEGATIVE
PCP UR QL SCN: NEGATIVE
PH UR STRIP.AUTO: 6.5 [PH] (ref 5–8)
PLATELET # BLD AUTO: 238 10*3/MM3 (ref 140–450)
PMV BLD AUTO: 9.1 FL (ref 6–12)
POTASSIUM SERPL-SCNC: 3.2 MMOL/L (ref 3.5–5.2)
PROT SERPL-MCNC: 7.2 G/DL (ref 6–8.5)
PROT UR QL STRIP: NEGATIVE
RBC # BLD AUTO: 4.54 10*6/MM3 (ref 4.14–5.8)
SALICYLATES SERPL-MCNC: <0.3 MG/DL
SODIUM SERPL-SCNC: 142 MMOL/L (ref 136–145)
SP GR UR STRIP: <=1.005 (ref 1–1.03)
TRICYCLICS UR QL SCN: NEGATIVE
TSH SERPL DL<=0.05 MIU/L-ACNC: 1.38 UIU/ML (ref 0.27–4.2)
UROBILINOGEN UR QL STRIP: NORMAL
WBC NRBC COR # BLD AUTO: 7.21 10*3/MM3 (ref 3.4–10.8)
WHOLE BLOOD HOLD COAG: NORMAL
WHOLE BLOOD HOLD SPECIMEN: NORMAL

## 2025-05-28 PROCEDURE — 99285 EMERGENCY DEPT VISIT HI MDM: CPT | Performed by: EMERGENCY MEDICINE

## 2025-05-28 PROCEDURE — 80053 COMPREHEN METABOLIC PANEL: CPT | Performed by: EMERGENCY MEDICINE

## 2025-05-28 PROCEDURE — 80143 DRUG ASSAY ACETAMINOPHEN: CPT | Performed by: EMERGENCY MEDICINE

## 2025-05-28 PROCEDURE — 81003 URINALYSIS AUTO W/O SCOPE: CPT | Performed by: EMERGENCY MEDICINE

## 2025-05-28 PROCEDURE — 36415 COLL VENOUS BLD VENIPUNCTURE: CPT

## 2025-05-28 PROCEDURE — 80307 DRUG TEST PRSMV CHEM ANLYZR: CPT | Performed by: EMERGENCY MEDICINE

## 2025-05-28 PROCEDURE — 84443 ASSAY THYROID STIM HORMONE: CPT | Performed by: EMERGENCY MEDICINE

## 2025-05-28 PROCEDURE — 80179 DRUG ASSAY SALICYLATE: CPT | Performed by: EMERGENCY MEDICINE

## 2025-05-28 PROCEDURE — 85025 COMPLETE CBC W/AUTO DIFF WBC: CPT | Performed by: EMERGENCY MEDICINE

## 2025-05-28 PROCEDURE — 82077 ASSAY SPEC XCP UR&BREATH IA: CPT | Performed by: EMERGENCY MEDICINE
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
## 2025-07-09 ENCOUNTER — HOSPITAL ENCOUNTER (EMERGENCY)
Facility: HOSPITAL | Age: 60
Discharge: HOME | End: 2025-07-09
Attending: EMERGENCY MEDICINE | Admitting: EMERGENCY MEDICINE
Payer: MEDICAID

## 2025-07-09 VITALS
HEART RATE: 64 BPM | OXYGEN SATURATION: 100 % | HEIGHT: 68 IN | WEIGHT: 189 LBS | RESPIRATION RATE: 18 BRPM | SYSTOLIC BLOOD PRESSURE: 145 MMHG | DIASTOLIC BLOOD PRESSURE: 74 MMHG | TEMPERATURE: 97.7 F | BODY MASS INDEX: 28.64 KG/M2

## 2025-07-09 DIAGNOSIS — R45.851 SUICIDAL IDEATIONS: Primary | ICD-10-CM

## 2025-07-09 LAB
ALBUMIN SERPL-MCNC: 3.8 G/DL (ref 3.5–5.2)
ALBUMIN/GLOB SERPL: 1.1 G/DL
ALP SERPL-CCNC: 124 U/L (ref 39–117)
ALT SERPL W P-5'-P-CCNC: 8 U/L (ref 1–41)
AMPHET+METHAMPHET UR QL: POSITIVE
AMPHETAMINES UR QL: POSITIVE
ANION GAP SERPL CALCULATED.3IONS-SCNC: 11.2 MMOL/L (ref 5–15)
APAP SERPL-MCNC: <5 MCG/ML (ref 0–30)
AST SERPL-CCNC: 15 U/L (ref 1–40)
BARBITURATES UR QL SCN: NEGATIVE
BASOPHILS # BLD AUTO: 0.04 10*3/MM3 (ref 0–0.2)
BASOPHILS NFR BLD AUTO: 0.5 % (ref 0–1.5)
BENZODIAZ UR QL SCN: NEGATIVE
BILIRUB SERPL-MCNC: 0.3 MG/DL (ref 0–1.2)
BILIRUB UR QL STRIP: NEGATIVE
BUN SERPL-MCNC: 17 MG/DL (ref 6–20)
BUN/CREAT SERPL: 17.3 (ref 7–25)
BUPRENORPHINE SERPL-MCNC: NEGATIVE NG/ML
CALCIUM SPEC-SCNC: 9.4 MG/DL (ref 8.6–10.5)
CANNABINOIDS SERPL QL: POSITIVE
CHLORIDE SERPL-SCNC: 104 MMOL/L (ref 98–107)
CLARITY UR: CLEAR
CO2 SERPL-SCNC: 22.8 MMOL/L (ref 22–29)
COCAINE UR QL: NEGATIVE
COLOR UR: YELLOW
CREAT SERPL-MCNC: 0.98 MG/DL (ref 0.76–1.27)
DEPRECATED RDW RBC AUTO: 46.6 FL (ref 37–54)
EGFRCR SERPLBLD CKD-EPI 2021: 89.4 ML/MIN/1.73
EOSINOPHIL # BLD AUTO: 0.13 10*3/MM3 (ref 0–0.4)
EOSINOPHIL NFR BLD AUTO: 1.7 % (ref 0.3–6.2)
ERYTHROCYTE [DISTWIDTH] IN BLOOD BY AUTOMATED COUNT: 15.3 % (ref 12.3–15.4)
ETHANOL BLD-MCNC: <10 MG/DL (ref 0–10)
ETHANOL UR QL: <0.01 %
FENTANYL UR-MCNC: NEGATIVE NG/ML
GLOBULIN UR ELPH-MCNC: 3.4 GM/DL
GLUCOSE SERPL-MCNC: 96 MG/DL (ref 65–99)
GLUCOSE UR STRIP-MCNC: NEGATIVE MG/DL
HCT VFR BLD AUTO: 34.2 % (ref 37.5–51)
HGB BLD-MCNC: 11.1 G/DL (ref 13–17.7)
HGB UR QL STRIP.AUTO: NEGATIVE
HOLD SPECIMEN: NORMAL
HOLD SPECIMEN: NORMAL
IMM GRANULOCYTES # BLD AUTO: 0.05 10*3/MM3 (ref 0–0.05)
IMM GRANULOCYTES NFR BLD AUTO: 0.7 % (ref 0–0.5)
KETONES UR QL STRIP: NEGATIVE
LEUKOCYTE ESTERASE UR QL STRIP.AUTO: NEGATIVE
LYMPHOCYTES # BLD AUTO: 2.72 10*3/MM3 (ref 0.7–3.1)
LYMPHOCYTES NFR BLD AUTO: 36 % (ref 19.6–45.3)
MAGNESIUM SERPL-MCNC: 2.2 MG/DL (ref 1.6–2.6)
MCH RBC QN AUTO: 27 PG (ref 26.6–33)
MCHC RBC AUTO-ENTMCNC: 32.5 G/DL (ref 31.5–35.7)
MCV RBC AUTO: 83.2 FL (ref 79–97)
METHADONE UR QL SCN: NEGATIVE
MONOCYTES # BLD AUTO: 0.7 10*3/MM3 (ref 0.1–0.9)
MONOCYTES NFR BLD AUTO: 9.3 % (ref 5–12)
NEUTROPHILS NFR BLD AUTO: 3.92 10*3/MM3 (ref 1.7–7)
NEUTROPHILS NFR BLD AUTO: 51.8 % (ref 42.7–76)
NITRITE UR QL STRIP: NEGATIVE
NRBC BLD AUTO-RTO: 0 /100 WBC (ref 0–0.2)
OPIATES UR QL: NEGATIVE
OXYCODONE UR QL SCN: NEGATIVE
PCP UR QL SCN: NEGATIVE
PH UR STRIP.AUTO: 7.5 [PH] (ref 5–8)
PLATELET # BLD AUTO: 241 10*3/MM3 (ref 140–450)
PMV BLD AUTO: 9.4 FL (ref 6–12)
POTASSIUM SERPL-SCNC: 4.1 MMOL/L (ref 3.5–5.2)
PROT SERPL-MCNC: 7.2 G/DL (ref 6–8.5)
PROT UR QL STRIP: NEGATIVE
RBC # BLD AUTO: 4.11 10*6/MM3 (ref 4.14–5.8)
SALICYLATES SERPL-MCNC: <0.3 MG/DL
SODIUM SERPL-SCNC: 138 MMOL/L (ref 136–145)
SP GR UR STRIP: 1.01 (ref 1–1.03)
TRICYCLICS UR QL SCN: NEGATIVE
TSH SERPL DL<=0.05 MIU/L-ACNC: 1.74 UIU/ML (ref 0.27–4.2)
UROBILINOGEN UR QL STRIP: NORMAL
WBC NRBC COR # BLD AUTO: 7.56 10*3/MM3 (ref 3.4–10.8)
WHOLE BLOOD HOLD COAG: NORMAL
WHOLE BLOOD HOLD SPECIMEN: NORMAL

## 2025-07-09 PROCEDURE — 80143 DRUG ASSAY ACETAMINOPHEN: CPT | Performed by: PHYSICIAN ASSISTANT

## 2025-07-09 PROCEDURE — 36415 COLL VENOUS BLD VENIPUNCTURE: CPT

## 2025-07-09 PROCEDURE — 80179 DRUG ASSAY SALICYLATE: CPT | Performed by: PHYSICIAN ASSISTANT

## 2025-07-09 PROCEDURE — 84443 ASSAY THYROID STIM HORMONE: CPT | Performed by: PHYSICIAN ASSISTANT

## 2025-07-09 PROCEDURE — 80053 COMPREHEN METABOLIC PANEL: CPT | Performed by: EMERGENCY MEDICINE

## 2025-07-09 PROCEDURE — 80307 DRUG TEST PRSMV CHEM ANLYZR: CPT | Performed by: EMERGENCY MEDICINE

## 2025-07-09 PROCEDURE — 93005 ELECTROCARDIOGRAM TRACING: CPT | Performed by: EMERGENCY MEDICINE

## 2025-07-09 PROCEDURE — 83735 ASSAY OF MAGNESIUM: CPT | Performed by: PHYSICIAN ASSISTANT

## 2025-07-09 PROCEDURE — 82077 ASSAY SPEC XCP UR&BREATH IA: CPT | Performed by: PHYSICIAN ASSISTANT

## 2025-07-09 PROCEDURE — 99285 EMERGENCY DEPT VISIT HI MDM: CPT | Performed by: EMERGENCY MEDICINE

## 2025-07-09 PROCEDURE — 81003 URINALYSIS AUTO W/O SCOPE: CPT | Performed by: EMERGENCY MEDICINE

## 2025-07-09 PROCEDURE — 85025 COMPLETE CBC W/AUTO DIFF WBC: CPT | Performed by: EMERGENCY MEDICINE
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
## 2025-07-22 ENCOUNTER — HOSPITAL ENCOUNTER (EMERGENCY)
Facility: HOSPITAL | Age: 60
Discharge: ANOTHER HOSPITAL | End: 2025-07-22
Attending: STUDENT IN AN ORGANIZED HEALTH CARE EDUCATION/TRAINING PROGRAM | Admitting: STUDENT IN AN ORGANIZED HEALTH CARE EDUCATION/TRAINING PROGRAM
Payer: MEDICAID

## 2025-07-22 VITALS
HEIGHT: 67 IN | HEART RATE: 67 BPM | OXYGEN SATURATION: 97 % | BODY MASS INDEX: 32.49 KG/M2 | RESPIRATION RATE: 18 BRPM | WEIGHT: 207 LBS | TEMPERATURE: 97.8 F | DIASTOLIC BLOOD PRESSURE: 77 MMHG | SYSTOLIC BLOOD PRESSURE: 117 MMHG

## 2025-07-22 DIAGNOSIS — Z59.00 HOMELESSNESS: ICD-10-CM

## 2025-07-22 DIAGNOSIS — R45.851 SUICIDAL IDEATIONS: Primary | ICD-10-CM

## 2025-07-22 LAB
ALBUMIN SERPL-MCNC: 4.2 G/DL (ref 3.5–5.2)
ALBUMIN/GLOB SERPL: 1.2 G/DL
ALP SERPL-CCNC: 142 U/L (ref 39–117)
ALT SERPL W P-5'-P-CCNC: 15 U/L (ref 1–41)
AMPHET+METHAMPHET UR QL: NEGATIVE
AMPHETAMINES UR QL: NEGATIVE
ANION GAP SERPL CALCULATED.3IONS-SCNC: 15.8 MMOL/L (ref 5–15)
APAP SERPL-MCNC: <5 MCG/ML (ref 0–30)
AST SERPL-CCNC: 22 U/L (ref 1–40)
BARBITURATES UR QL SCN: NEGATIVE
BASOPHILS # BLD AUTO: 0.06 10*3/MM3 (ref 0–0.2)
BASOPHILS NFR BLD AUTO: 0.7 % (ref 0–1.5)
BENZODIAZ UR QL SCN: NEGATIVE
BILIRUB SERPL-MCNC: 0.3 MG/DL (ref 0–1.2)
BILIRUB UR QL STRIP: NEGATIVE
BUN SERPL-MCNC: 18 MG/DL (ref 6–20)
BUN/CREAT SERPL: 17 (ref 7–25)
BUPRENORPHINE SERPL-MCNC: NEGATIVE NG/ML
CALCIUM SPEC-SCNC: 9.1 MG/DL (ref 8.6–10.5)
CANNABINOIDS SERPL QL: POSITIVE
CHLORIDE SERPL-SCNC: 103 MMOL/L (ref 98–107)
CLARITY UR: CLEAR
CO2 SERPL-SCNC: 19.2 MMOL/L (ref 22–29)
COCAINE UR QL: NEGATIVE
COLOR UR: YELLOW
CREAT SERPL-MCNC: 1.06 MG/DL (ref 0.76–1.27)
DEPRECATED RDW RBC AUTO: 43.6 FL (ref 37–54)
EGFRCR SERPLBLD CKD-EPI 2021: 81.3 ML/MIN/1.73
EOSINOPHIL # BLD AUTO: 0.17 10*3/MM3 (ref 0–0.4)
EOSINOPHIL NFR BLD AUTO: 2 % (ref 0.3–6.2)
ERYTHROCYTE [DISTWIDTH] IN BLOOD BY AUTOMATED COUNT: 14.9 % (ref 12.3–15.4)
ETHANOL BLD-MCNC: <10 MG/DL (ref 0–10)
ETHANOL UR QL: <0.01 %
FENTANYL UR-MCNC: NEGATIVE NG/ML
GLOBULIN UR ELPH-MCNC: 3.4 GM/DL
GLUCOSE SERPL-MCNC: 109 MG/DL (ref 65–99)
GLUCOSE UR STRIP-MCNC: NEGATIVE MG/DL
HCT VFR BLD AUTO: 37.1 % (ref 37.5–51)
HGB BLD-MCNC: 12.2 G/DL (ref 13–17.7)
HGB UR QL STRIP.AUTO: NEGATIVE
IMM GRANULOCYTES # BLD AUTO: 0.03 10*3/MM3 (ref 0–0.05)
IMM GRANULOCYTES NFR BLD AUTO: 0.4 % (ref 0–0.5)
KETONES UR QL STRIP: NEGATIVE
LEUKOCYTE ESTERASE UR QL STRIP.AUTO: NEGATIVE
LYMPHOCYTES # BLD AUTO: 3.94 10*3/MM3 (ref 0.7–3.1)
LYMPHOCYTES NFR BLD AUTO: 46.6 % (ref 19.6–45.3)
MAGNESIUM SERPL-MCNC: 2.2 MG/DL (ref 1.6–2.6)
MCH RBC QN AUTO: 26.7 PG (ref 26.6–33)
MCHC RBC AUTO-ENTMCNC: 32.9 G/DL (ref 31.5–35.7)
MCV RBC AUTO: 81.2 FL (ref 79–97)
METHADONE UR QL SCN: NEGATIVE
MONOCYTES # BLD AUTO: 0.74 10*3/MM3 (ref 0.1–0.9)
MONOCYTES NFR BLD AUTO: 8.7 % (ref 5–12)
NEUTROPHILS NFR BLD AUTO: 3.52 10*3/MM3 (ref 1.7–7)
NEUTROPHILS NFR BLD AUTO: 41.6 % (ref 42.7–76)
NITRITE UR QL STRIP: NEGATIVE
NRBC BLD AUTO-RTO: 0 /100 WBC (ref 0–0.2)
OPIATES UR QL: NEGATIVE
OXYCODONE UR QL SCN: NEGATIVE
PCP UR QL SCN: NEGATIVE
PH UR STRIP.AUTO: 6.5 [PH] (ref 5–8)
PLATELET # BLD AUTO: 243 10*3/MM3 (ref 140–450)
PMV BLD AUTO: 9.2 FL (ref 6–12)
POTASSIUM SERPL-SCNC: 3.5 MMOL/L (ref 3.5–5.2)
PROT SERPL-MCNC: 7.6 G/DL (ref 6–8.5)
PROT UR QL STRIP: NEGATIVE
RBC # BLD AUTO: 4.57 10*6/MM3 (ref 4.14–5.8)
SALICYLATES SERPL-MCNC: <0.3 MG/DL
SODIUM SERPL-SCNC: 138 MMOL/L (ref 136–145)
SP GR UR STRIP: 1.01 (ref 1–1.03)
TRICYCLICS UR QL SCN: NEGATIVE
TSH SERPL DL<=0.05 MIU/L-ACNC: 2.15 UIU/ML (ref 0.27–4.2)
UROBILINOGEN UR QL STRIP: NORMAL
WBC NRBC COR # BLD AUTO: 8.46 10*3/MM3 (ref 3.4–10.8)

## 2025-07-22 PROCEDURE — 80143 DRUG ASSAY ACETAMINOPHEN: CPT

## 2025-07-22 PROCEDURE — 84443 ASSAY THYROID STIM HORMONE: CPT

## 2025-07-22 PROCEDURE — 83735 ASSAY OF MAGNESIUM: CPT

## 2025-07-22 PROCEDURE — 80179 DRUG ASSAY SALICYLATE: CPT

## 2025-07-22 PROCEDURE — 85025 COMPLETE CBC W/AUTO DIFF WBC: CPT

## 2025-07-22 PROCEDURE — 80053 COMPREHEN METABOLIC PANEL: CPT

## 2025-07-22 PROCEDURE — 81003 URINALYSIS AUTO W/O SCOPE: CPT

## 2025-07-22 PROCEDURE — 82077 ASSAY SPEC XCP UR&BREATH IA: CPT

## 2025-07-22 PROCEDURE — 99285 EMERGENCY DEPT VISIT HI MDM: CPT | Performed by: STUDENT IN AN ORGANIZED HEALTH CARE EDUCATION/TRAINING PROGRAM

## 2025-07-22 PROCEDURE — 93005 ELECTROCARDIOGRAM TRACING: CPT

## 2025-07-22 PROCEDURE — 80307 DRUG TEST PRSMV CHEM ANLYZR: CPT

## 2025-07-22 PROCEDURE — 36415 COLL VENOUS BLD VENIPUNCTURE: CPT

## 2025-07-22 RX ORDER — ACETAMINOPHEN 325 MG/1
975 TABLET ORAL ONCE
Status: DISCONTINUED | OUTPATIENT
Start: 2025-07-22 | End: 2025-07-22 | Stop reason: HOSPADM

## 2025-07-22 RX ORDER — ATORVASTATIN CALCIUM 40 MG/1
40 TABLET, FILM COATED ORAL
COMMUNITY
Start: 2025-06-11

## 2025-07-22 RX ORDER — AMLODIPINE BESYLATE 5 MG/1
5 TABLET ORAL
COMMUNITY
Start: 2025-06-19

## 2025-07-22 SDOH — ECONOMIC STABILITY - HOUSING INSECURITY: HOMELESSNESS UNSPECIFIED: Z59.00
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
